# Patient Record
Sex: FEMALE | Race: WHITE | NOT HISPANIC OR LATINO
[De-identification: names, ages, dates, MRNs, and addresses within clinical notes are randomized per-mention and may not be internally consistent; named-entity substitution may affect disease eponyms.]

---

## 2018-04-30 PROBLEM — Z00.00 ENCOUNTER FOR PREVENTIVE HEALTH EXAMINATION: Status: ACTIVE | Noted: 2018-04-30

## 2018-05-02 ENCOUNTER — APPOINTMENT (OUTPATIENT)
Dept: ORTHOPEDIC SURGERY | Facility: CLINIC | Age: 48
End: 2018-05-02
Payer: COMMERCIAL

## 2018-05-02 VITALS — HEIGHT: 66 IN | WEIGHT: 190 LBS | BODY MASS INDEX: 30.53 KG/M2

## 2018-05-02 PROCEDURE — 72040 X-RAY EXAM NECK SPINE 2-3 VW: CPT

## 2018-05-02 PROCEDURE — 73590 X-RAY EXAM OF LOWER LEG: CPT | Mod: LT

## 2018-05-02 PROCEDURE — 99204 OFFICE O/P NEW MOD 45 MIN: CPT | Mod: 57

## 2018-05-02 PROCEDURE — 27759 TREATMENT OF TIBIA FRACTURE: CPT | Mod: 55,LT

## 2018-05-15 ENCOUNTER — APPOINTMENT (OUTPATIENT)
Dept: ORTHOPEDIC SURGERY | Facility: CLINIC | Age: 48
End: 2018-05-15
Payer: COMMERCIAL

## 2018-05-15 PROCEDURE — 99024 POSTOP FOLLOW-UP VISIT: CPT

## 2018-05-19 ENCOUNTER — OUTPATIENT (OUTPATIENT)
Dept: OUTPATIENT SERVICES | Facility: HOSPITAL | Age: 48
LOS: 1 days | End: 2018-05-19
Payer: COMMERCIAL

## 2018-05-19 ENCOUNTER — APPOINTMENT (OUTPATIENT)
Dept: CT IMAGING | Facility: CLINIC | Age: 48
End: 2018-05-19
Payer: COMMERCIAL

## 2018-05-19 DIAGNOSIS — Z00.8 ENCOUNTER FOR OTHER GENERAL EXAMINATION: ICD-10-CM

## 2018-05-19 PROCEDURE — 73700 CT LOWER EXTREMITY W/O DYE: CPT

## 2018-05-19 PROCEDURE — 73700 CT LOWER EXTREMITY W/O DYE: CPT | Mod: 26,LT

## 2018-05-25 ENCOUNTER — OTHER (OUTPATIENT)
Age: 48
End: 2018-05-25

## 2018-05-28 ENCOUNTER — EMERGENCY (EMERGENCY)
Facility: HOSPITAL | Age: 48
LOS: 1 days | Discharge: HOME | End: 2018-05-28
Attending: EMERGENCY MEDICINE
Payer: COMMERCIAL

## 2018-05-28 VITALS
SYSTOLIC BLOOD PRESSURE: 121 MMHG | DIASTOLIC BLOOD PRESSURE: 71 MMHG | OXYGEN SATURATION: 98 % | RESPIRATION RATE: 18 BRPM | HEART RATE: 80 BPM

## 2018-05-28 VITALS
OXYGEN SATURATION: 97 % | RESPIRATION RATE: 18 BRPM | HEART RATE: 99 BPM | DIASTOLIC BLOOD PRESSURE: 83 MMHG | WEIGHT: 179.9 LBS | TEMPERATURE: 99 F | SYSTOLIC BLOOD PRESSURE: 117 MMHG | HEIGHT: 66 IN

## 2018-05-28 LAB
ALBUMIN SERPL ELPH-MCNC: 4.2 G/DL — SIGNIFICANT CHANGE UP (ref 3.3–5.2)
ALP SERPL-CCNC: 109 U/L — SIGNIFICANT CHANGE UP (ref 40–120)
ALT FLD-CCNC: 11 U/L — SIGNIFICANT CHANGE UP
ANION GAP SERPL CALC-SCNC: 14 MMOL/L — SIGNIFICANT CHANGE UP (ref 5–17)
APTT BLD: 27.5 SEC — SIGNIFICANT CHANGE UP (ref 27.5–37.4)
AST SERPL-CCNC: 16 U/L — SIGNIFICANT CHANGE UP
BASOPHILS # BLD AUTO: 0.1 K/UL — SIGNIFICANT CHANGE UP (ref 0–0.2)
BASOPHILS NFR BLD AUTO: 1 % — SIGNIFICANT CHANGE UP (ref 0–2)
BILIRUB SERPL-MCNC: 0.3 MG/DL — LOW (ref 0.4–2)
BUN SERPL-MCNC: 14 MG/DL — SIGNIFICANT CHANGE UP (ref 8–20)
CALCIUM SERPL-MCNC: 9.5 MG/DL — SIGNIFICANT CHANGE UP (ref 8.6–10.2)
CHLORIDE SERPL-SCNC: 103 MMOL/L — SIGNIFICANT CHANGE UP (ref 98–107)
CK SERPL-CCNC: 31 U/L — SIGNIFICANT CHANGE UP (ref 25–170)
CO2 SERPL-SCNC: 24 MMOL/L — SIGNIFICANT CHANGE UP (ref 22–29)
CREAT SERPL-MCNC: 0.63 MG/DL — SIGNIFICANT CHANGE UP (ref 0.5–1.3)
EOSINOPHIL # BLD AUTO: 0.6 K/UL — HIGH (ref 0–0.5)
EOSINOPHIL NFR BLD AUTO: 6.6 % — HIGH (ref 0–6)
GLUCOSE SERPL-MCNC: 92 MG/DL — SIGNIFICANT CHANGE UP (ref 70–115)
HCG SERPL-ACNC: <5 MIU/ML — SIGNIFICANT CHANGE UP
HCT VFR BLD CALC: 40.9 % — SIGNIFICANT CHANGE UP (ref 37–47)
HGB BLD-MCNC: 13.3 G/DL — SIGNIFICANT CHANGE UP (ref 12–16)
INR BLD: 1 RATIO — SIGNIFICANT CHANGE UP (ref 0.88–1.16)
LACTATE BLDV-MCNC: 1.7 MMOL/L — SIGNIFICANT CHANGE UP (ref 0.5–2)
LYMPHOCYTES # BLD AUTO: 1.6 K/UL — SIGNIFICANT CHANGE UP (ref 1–4.8)
LYMPHOCYTES # BLD AUTO: 17.8 % — LOW (ref 20–55)
MCHC RBC-ENTMCNC: 31.1 PG — HIGH (ref 27–31)
MCHC RBC-ENTMCNC: 32.5 G/DL — SIGNIFICANT CHANGE UP (ref 32–36)
MCV RBC AUTO: 95.8 FL — SIGNIFICANT CHANGE UP (ref 81–99)
MONOCYTES # BLD AUTO: 0.6 K/UL — SIGNIFICANT CHANGE UP (ref 0–0.8)
MONOCYTES NFR BLD AUTO: 7.2 % — SIGNIFICANT CHANGE UP (ref 3–10)
NEUTROPHILS # BLD AUTO: 6 K/UL — SIGNIFICANT CHANGE UP (ref 1.8–8)
NEUTROPHILS NFR BLD AUTO: 67.2 % — SIGNIFICANT CHANGE UP (ref 37–73)
PLATELET # BLD AUTO: 320 K/UL — SIGNIFICANT CHANGE UP (ref 150–400)
POTASSIUM SERPL-MCNC: 4.2 MMOL/L — SIGNIFICANT CHANGE UP (ref 3.5–5.3)
POTASSIUM SERPL-SCNC: 4.2 MMOL/L — SIGNIFICANT CHANGE UP (ref 3.5–5.3)
PROT SERPL-MCNC: 7.3 G/DL — SIGNIFICANT CHANGE UP (ref 6.6–8.7)
PROTHROM AB SERPL-ACNC: 11 SEC — SIGNIFICANT CHANGE UP (ref 9.8–12.7)
RBC # BLD: 4.27 M/UL — LOW (ref 4.4–5.2)
RBC # FLD: 12.5 % — SIGNIFICANT CHANGE UP (ref 11–15.6)
SODIUM SERPL-SCNC: 141 MMOL/L — SIGNIFICANT CHANGE UP (ref 135–145)
WBC # BLD: 8.9 K/UL — SIGNIFICANT CHANGE UP (ref 4.8–10.8)
WBC # FLD AUTO: 8.9 K/UL — SIGNIFICANT CHANGE UP (ref 4.8–10.8)

## 2018-05-28 PROCEDURE — 36415 COLL VENOUS BLD VENIPUNCTURE: CPT

## 2018-05-28 PROCEDURE — 85027 COMPLETE CBC AUTOMATED: CPT

## 2018-05-28 PROCEDURE — 73590 X-RAY EXAM OF LOWER LEG: CPT

## 2018-05-28 PROCEDURE — 85610 PROTHROMBIN TIME: CPT

## 2018-05-28 PROCEDURE — 73610 X-RAY EXAM OF ANKLE: CPT

## 2018-05-28 PROCEDURE — 99284 EMERGENCY DEPT VISIT MOD MDM: CPT

## 2018-05-28 PROCEDURE — 85730 THROMBOPLASTIN TIME PARTIAL: CPT

## 2018-05-28 PROCEDURE — 83605 ASSAY OF LACTIC ACID: CPT

## 2018-05-28 PROCEDURE — 73610 X-RAY EXAM OF ANKLE: CPT | Mod: 26,LT

## 2018-05-28 PROCEDURE — 93971 EXTREMITY STUDY: CPT | Mod: 26,LT

## 2018-05-28 PROCEDURE — 80053 COMPREHEN METABOLIC PANEL: CPT

## 2018-05-28 PROCEDURE — 93971 EXTREMITY STUDY: CPT

## 2018-05-28 PROCEDURE — 73590 X-RAY EXAM OF LOWER LEG: CPT | Mod: 26,LT

## 2018-05-28 PROCEDURE — 84702 CHORIONIC GONADOTROPIN TEST: CPT

## 2018-05-28 PROCEDURE — 82550 ASSAY OF CK (CPK): CPT

## 2018-05-28 NOTE — ED ADULT NURSE REASSESSMENT NOTE - NS ED NURSE REASSESS COMMENT FT1
pt with no complaints. awaiting ortho for consult, even and unlabored resps with stable vital signs. family at bedside, no change in status since arrival.

## 2018-05-28 NOTE — ED PROVIDER NOTE - PROGRESS NOTE DETAILS
Labs and imaging reassuring. Pt comfortable, neurovascularly intact per ortho. No emergent causes of her paresthesias identified. No concern for compartment syndrome. No evidence of vascular insufficiency. Hardware in place. No DVT. Pt advised nonweightbearing, crutches, RICE, analgesia, f/u with ortho as outpatient Labs and imaging reassuring. Pt comfortable, neurovascularly intact per ortho. No emergent causes of her paresthesias identified. Suspect probable peripheral neuropathy 2/2 surgery without signs of significant compromise. No concern for compartment syndrome. No evidence of vascular insufficiency. Hardware in place. No DVT. Pt advised nonweightbearing, crutches, RICE, analgesia, f/u with ortho as outpatient

## 2018-05-28 NOTE — ED ADULT NURSE NOTE - OBJECTIVE STATEMENT
pt comes to ED with reports of discoloration and increased pain to her lower leg. states she had broken her tib/fib last month, had surgery up in [facility] and now is f.u with ortho Dr Sims here. pt with no manually palpable pedal pulse, but palpable with doppler. pt with swelling noted as well. pt with no cast or splint in place on this RN assessment, skin temp equal bilaterally. pt with slightly paler left lower extremity compared to right. no other complaints, reports she had a repeat CT done last week with no acute findings.

## 2018-05-28 NOTE — ED ADULT NURSE NOTE - CHIEF COMPLAINT QUOTE
Pt BIBA A&Ox3 c/o LLE pain, reports she had tib/fib fracture with surgery approx 1 month ago in [place]. Pt follows up with Dr. Sims, had recent CT scan done, negative for anything acute. Obvious swelling and discoloration present to LLE. Unable to palpate pedal pulses, + pulses present with doppler. Pt c/o worsening pain, numbness and tingling since last week to LLE.

## 2018-05-28 NOTE — ED PROVIDER NOTE - PHYSICAL EXAMINATION
VITALS: reviewed  GEN: NAD, A & O x 4  HEAD/EYES: NCAT, PERRL, EOMI, anicteric sclerae, no conjunctival pallor  ENT: mucus membranes moist, oropharynx WNL, trachea midline, no JVD  RESP: lungs CTA with equal breath sounds bilaterally, chest wall nontender and atraumatic  CV: heart with reg rhythm S1, S2, no murmur; distal pulses intact and symmetric bilaterally  ABDOMEN: normoactive bowel sounds, soft, nondistended, nontender, no palpable masses  : no CVAT  MSK: palpable 1+ dp, compartments are soft, nontender, surgical site clean, dry intact. Skin is shiny, sluggish <2 sec cap refill LLE, whole LLE slightly more swollen than RLE, slightly decreased sensation Medial inter side of LLE, full strength and full range of motion   SKIN: warm, dry, no rash, no bruising, no cyanosis. color appropriate for ethnicity  NEURO: alert, mentating appropriately, no facial asymmetry. gross sensation, motor, coordination are intact  PSYCH: Affect appropriate VITALS: reviewed  GEN: NAD, A & O x 4  HEAD/EYES: NCAT, PERRL, EOMI, anicteric sclerae, no conjunctival pallor  ENT: mucus membranes moist, oropharynx WNL, trachea midline, no JVD  RESP: lungs CTA with equal breath sounds bilaterally, chest wall nontender and atraumatic  CV: heart with reg rhythm S1, S2, no murmur; distal pulses intact and symmetric bilaterally  ABDOMEN: normoactive bowel sounds, soft, nondistended, nontender, no palpable masses  : no CVAT  MSK: palpable 1+ dp, compartments are soft, nontender, surgical site clean, dry intact. Skin is shiny, sluggish <2 sec cap refill LLE, whole LLE slightly more swollen than RLE, full strength and full range of motion   SKIN: warm, dry, no rash, no bruising, no cyanosis. color appropriate for ethnicity  NEURO: alert, mentating appropriately, no facial asymmetry. 5/5 strength throughout all distributions in LEs, sensation intact to light touch throughout without any objective area of deficit, pt with subjective decreased sensation Medial inter side of LLE  PSYCH: Affect appropriate

## 2018-05-28 NOTE — ED PROVIDER NOTE - MEDICAL DECISION MAKING DETAILS
Pt with worsening pain and swelling in LLE ankle, ankle compartments are soft, positive pulses. Subjective decreased sensation and superficial perineal and possible medial plantar distribution.  No motor deficits. Low suspicion for compartment syndrome, low suspicion for acute arterial thrombosis. Will check x-ray for cause in hardware, check for DVT, consult orthopedic PA.

## 2018-05-28 NOTE — ED PROVIDER NOTE - NS ED ROS FT
CONST: see HPI  EYES: no pain, no visual disturbances  ENT: no sore throat, no epistaxis, no rhinorrhea, no hearing changes  CV: no chest pain, no palpitations, no orthopnea, no extremity pain or swelling  RESP: no shortness of breath, no cough, no sputum, no pleurisy, no wheezing  ABD: no abdominal pain, no nausea, no vomiting, no diarrhea, no black or bloody stool  : no dysuria, no hematuria, no frequency, no urgency  MSK: no back pain, no neck pain, no extremity pain  NEURO: no headache, no sensory disturbances, no focal weakness, no dizziness  HEME: no easy bleeding or bruising  SKIN: no diaphoresis, no rash CONST: see HPI  EYES: no pain, no visual disturbances  ENT: no sore throat, no epistaxis, no rhinorrhea, no hearing changes  CV: no chest pain, no palpitations, no orthopnea, no extremity pain or swelling  RESP: no shortness of breath, no cough, no sputum, no pleurisy, no wheezing  ABD: no abdominal pain, no nausea, no vomiting, no diarrhea, no black or bloody stool  : no dysuria, no hematuria, no frequency, no urgency  MSK: no back pain, no neck pain   NEURO: no headache, no sensory disturbances, no focal weakness, no dizziness  HEME: no easy bleeding or bruising  SKIN: no diaphoresis, no rash CONST: see HPI  EYES: no pain, no visual disturbances  ENT: no sore throat, no epistaxis, no rhinorrhea, no hearing changes  CV: no chest pain, no palpitations, no orthopnea, no extremity pain or swelling  RESP: no shortness of breath, no cough, no sputum, no pleurisy, no wheezing  ABD: no abdominal pain, no nausea, no vomiting, no diarrhea, no black or bloody stool  : no dysuria, no hematuria, no frequency, no urgency  MSK: no back pain, no neck pain   NEURO: see HPI, no abel weakness  HEME: no easy bleeding or bruising  SKIN: no diaphoresis, no rash

## 2018-05-28 NOTE — ED PROVIDER NOTE - OBJECTIVE STATEMENT
48 y/o F hx asthma, hypothyroid presents to the ED c/o constant, LLE ankle pain s/p surgery x3-4 days. Pt had a recent surgery on her LLE, she has had constant pain which has worsened the last few days. Post op the pt was prescribed blood thinners and took them for 2 weeks. Confirms tingling upon palpation, and numbness. Confirms loss os sensation on LLE at foot and around ankle compared to RRE. Notes pain when she moves hr ankle and she notes that her leg remains elevated 90% of the time at home. Also states that this morning she felt like she needed her asthma  inhaler but the symptoms resolved on their own. Denies hx blood clots, chest pain, cough, abd pain, dysuria, fever, chills. No further complaints at this time. 48 y/o F hx asthma, hypothyroid presents to the ED c/o constant, LLE ankle pain s/p surgery x3-4 days. Pt had a recent surgery on her LLE, she has had constant pain which has worsened the last few days. Post op the pt was prescribed blood thinners and took them for 2 weeks. Pt complains of persistent tingling without abel nujmbness on LLE at foot and around ankle compared to RRE. Notes pain when she moves hr ankle and she notes that her leg remains elevated 90% of the time at home. Also states that this morning she felt like she needed her asthma  inhaler but the symptoms resolved on their own. Denies hx blood clots, chest pain, cough, abd pain, dysuria, fever, chills. No further complaints at this time. Declining all pain medication at this time.

## 2018-05-28 NOTE — ED ADULT TRIAGE NOTE - CHIEF COMPLAINT QUOTE
Pt BIBA A&Ox3 c/o LLE pain, reports she had tib/fib fracture with surgery approx 1 month ago in [place]. Pt follows up with Dr. Sims, had recent CT scan done, negative for anything acute. Obvious swelling and discoloration present to LLE. Thready pulses present. Pt BIBA A&Ox3 c/o LLE pain, reports she had tib/fib fracture with surgery approx 1 month ago in [place]. Pt follows up with Dr. Sims, had recent CT scan done, negative for anything acute. Obvious swelling and discoloration present to LLE. Unable to palpate pedal pulses, + pulses present with doppler. Pt BIBA A&Ox3 c/o LLE pain, reports she had tib/fib fracture with surgery approx 1 month ago in [place]. Pt follows up with Dr. Sims, had recent CT scan done, negative for anything acute. Obvious swelling and discoloration present to LLE. Unable to palpate pedal pulses, + pulses present with doppler. Pt c/o worsening pain, numbness and tingling since last week to LLE.

## 2018-05-28 NOTE — ED PROVIDER NOTE - PLAN OF CARE
You were seen and evaluated in the emergency department for tingling and swelling in your legs. Your vascular exam was intact, your neurolgic exam was intact. Your labs were reassuring. You had no evidence of a blood clot or hardware migration on imaging. Your exam and workup were otherwise reassuring. You were seen by Dr. Sims's team in orthopedics who felt that there was no immediately dangerous cause of your condition to require intervention.    You may take up to 400mg of ibuprofen (Motrin, Advil) every 6 hours as needed for pain. Please take ibuprofen with food if possible to prevent stomach upset. You may also take up to 1000mg of acetaminophen (Tylenol) every 6 hours as needed for pain. It is ok to mix these medications with each other. Do not mix them with other pain medications such as naproxen (Alieve) or asprin unless specifically instructed by your doctor. Many prescription pain medications and cough medications contain acetaminophen. You may apply ice to the affected area for no more than 20 minutes as needed for comfort. You may apply ice every 2-4 times a day as needed. Elevate your leg above your waist when you are not sitting on it. Use crutches and do not put weight on your left foot    Please return for worsening sensory changes, numbness, blue discoloration, inability to move your feet or toes, significant worsening pain, fevers, chills or other worsening or concerning new symptoms.    Follow up with with your orthopedist in the next week.  Please call your primary care doctor and discuss the visit. You were seen and evaluated in the emergency department for tingling and swelling in your legs. Your vascular exam was intact, your neurolgic exam was intact. Your labs were reassuring. You had no evidence of a blood clot or hardware migration on imaging. Your exam and workup were otherwise reassuring. You were seen by Dr. Sims's team in orthopedics who felt that there was no immediately dangerous cause of your condition to require intervention.    You may take up to 400mg of ibuprofen (Motrin, Advil) every 6 hours as needed for pain. Please take ibuprofen with food if possible to prevent stomach upset. You may also take up to 1000mg of acetaminophen (Tylenol) every 6 hours as needed for pain. It is ok to mix these medications with each other. Do not mix them with other pain medications such as naproxen (Alieve) or asprin unless specifically instructed by your doctor. Many prescription pain medications and cough medications contain acetaminophen. You may apply ice to the affected area for no more than 20 minutes as needed for comfort. You may apply ice every 2-4 times a day as needed. Elevate your leg above your waist when you are not sitting on it. Use crutches and do not put weight on your left foot    Please return for worsening sensory changes, numbness, blue discoloration, inability to move your feet or toes, significant worsening pain, fevers, chills or other worsening or concerning new symptoms.    If your pain and swelling do not improve over the next week, you may need a repeat ultrasound. Please discuss this with Dr. Sims  Follow up with with your orthopedist in the next week.  Please call your primary care doctor and discuss the visit.

## 2018-05-28 NOTE — ED PROVIDER NOTE - CARE PLAN
Principal Discharge DX:	Paresthesia  Assessment and plan of treatment:	You were seen and evaluated in the emergency department for tingling and swelling in your legs. Your vascular exam was intact, your neurolgic exam was intact. Your labs were reassuring. You had no evidence of a blood clot or hardware migration on imaging. Your exam and workup were otherwise reassuring. You were seen by Dr. Sims's team in orthopedics who felt that there was no immediately dangerous cause of your condition to require intervention.    You may take up to 400mg of ibuprofen (Motrin, Advil) every 6 hours as needed for pain. Please take ibuprofen with food if possible to prevent stomach upset. You may also take up to 1000mg of acetaminophen (Tylenol) every 6 hours as needed for pain. It is ok to mix these medications with each other. Do not mix them with other pain medications such as naproxen (Alieve) or asprin unless specifically instructed by your doctor. Many prescription pain medications and cough medications contain acetaminophen. You may apply ice to the affected area for no more than 20 minutes as needed for comfort. You may apply ice every 2-4 times a day as needed. Elevate your leg above your waist when you are not sitting on it. Use crutches and do not put weight on your left foot    Please return for worsening sensory changes, numbness, blue discoloration, inability to move your feet or toes, significant worsening pain, fevers, chills or other worsening or concerning new symptoms.    Follow up with with your orthopedist in the next week.  Please call your primary care doctor and discuss the visit.  Secondary Diagnosis:	Leg swelling  Secondary Diagnosis:	Post-operative state Principal Discharge DX:	Paresthesia  Assessment and plan of treatment:	You were seen and evaluated in the emergency department for tingling and swelling in your legs. Your vascular exam was intact, your neurolgic exam was intact. Your labs were reassuring. You had no evidence of a blood clot or hardware migration on imaging. Your exam and workup were otherwise reassuring. You were seen by Dr. Sims's team in orthopedics who felt that there was no immediately dangerous cause of your condition to require intervention.    You may take up to 400mg of ibuprofen (Motrin, Advil) every 6 hours as needed for pain. Please take ibuprofen with food if possible to prevent stomach upset. You may also take up to 1000mg of acetaminophen (Tylenol) every 6 hours as needed for pain. It is ok to mix these medications with each other. Do not mix them with other pain medications such as naproxen (Alieve) or asprin unless specifically instructed by your doctor. Many prescription pain medications and cough medications contain acetaminophen. You may apply ice to the affected area for no more than 20 minutes as needed for comfort. You may apply ice every 2-4 times a day as needed. Elevate your leg above your waist when you are not sitting on it. Use crutches and do not put weight on your left foot    Please return for worsening sensory changes, numbness, blue discoloration, inability to move your feet or toes, significant worsening pain, fevers, chills or other worsening or concerning new symptoms.    If your pain and swelling do not improve over the next week, you may need a repeat ultrasound. Please discuss this with Dr. Sims  Follow up with with your orthopedist in the next week.  Please call your primary care doctor and discuss the visit.  Secondary Diagnosis:	Leg swelling  Secondary Diagnosis:	Post-operative state

## 2018-05-28 NOTE — CONSULT NOTE ADULT - SUBJECTIVE AND OBJECTIVE BOX
Pt Name: SMITA ASCENCIO    MRN: [id number]      Patient is a 47y Female presenting to the emergency department with a chief complaint of left foot swelling and paresthesias for 1 week. Pt had surgery for tib-fib fx in [place], was seen by Dr Sims outpatient post-op. Pt has been non-weight bearing on the extremity. Pt was seen in the office 2 weeks ago to have sutures removed, pt was asymptomatic and exam was unremarkable. Over the past 1 week pt gradually developed swelling of the foot and ankle, discoloration, and parasthesias at the toes. Pt reports no trauma, no inciting incident. Pain states pain is minimal, and the numbness/discoloration is the reason for her visit. Denies fevers/chills, nausea, vomiting.     REVIEW OF SYSTEMS    General: Alert, responsive, in NAD    Skin/Breast: No rashes, no pruritis     Ophthalmologic: No visual changes. No redness.   	  ENMT:	No discharge. No swelling.    Respiratory and Thorax: No difficulty breathing. No cough.  	   Cardiovascular:	No chest pain. No palpitations.    Gastrointestinal:	 No abdominal pain. No diarrhea.     Genitourinary: No dysuria. No bleeding.    Musculoskeletal: SEE HPI.    Neurological: No sensory or motor changes.     Psychiatric: No anxiety or depression.    Hematology/Lymphatics: No swelling.    Endocrine: No Hx of diabetes.    ROS is otherwise negative.    PAST MEDICAL & SURGICAL HISTORY:  PAST MEDICAL & SURGICAL HISTORY:      Allergies: No Known Allergies      Medications:     FAMILY HISTORY:  : non-contributory    Social History:     Ambulation: Walking independently [ ] With Cane [ ] With Walker [ ]  Bedbound [ ] Crutches [+]                          13.3   8.9   )-----------( 320      ( 28 May 2018 11:53 )             40.9       05-28    141  |  103  |  14.0  ----------------------------<  92  4.2   |  24.0  |  0.63    Ca    9.5      28 May 2018 11:53    TPro  7.3  /  Alb  4.2  /  TBili  0.3<L>  /  DBili  x   /  AST  16  /  ALT  11  /  AlkPhos  109  05-28      Vital Signs Last 24 Hrs  T(C): 37 (28 May 2018 10:57), Max: 37 (28 May 2018 10:57)  T(F): 98.6 (28 May 2018 10:57), Max: 98.6 (28 May 2018 10:57)  HR: 99 (28 May 2018 10:57) (99 - 99)  BP: 117/83 (28 May 2018 10:57) (117/83 - 117/83)  BP(mean): --  RR: 18 (28 May 2018 10:57) (18 - 18)  SpO2: 97% (28 May 2018 10:57) (97% - 97%)    Daily Height in cm: 167.64 (28 May 2018 10:57)    Daily       PHYSICAL EXAM:      Appearance: Alert, responsive, in no acute distress.    Skin: mild reddish-purple discoloration over the dorsum of foot, mild effusion of the ankle and foot    Musculoskeletal:         Left Upper Extremity:       Right Upper Extremity:       Left Lower Extremity: no motor of EHL/FHL, AT/GC+, decreased sensation over the 1st digit and first web space, tenderness over the metacarpals cap refill <2 sec, 2+ DP pulse, calf non-tender, compartments soft and compressible       Right Lower Extremity:    Imaging Studies:    < from: US Duplex Venous Lower Ext Ltd, Left (05.28.18 @ 12:00) >     EXAM:  US DPLX LWR EXT VEINS LTD LT                          PROCEDURE DATE:  05/28/2018        INTERPRETATION:  CLINICAL INFORMATION: Left lower cavity pain and swelling    COMPARISON: None available.    TECHNIQUE: Duplex sonography of the LEFTLOWER extremity with color and   spectral Doppler, with and without compression.      FINDINGS:    There is normal compressibility of the left common femoral, femoral and   popliteal veins. No calf vein thrombosis is detected.    The contralateral common femoral vein is patent.    Doppler examination shows normal spontaneous and phasic flow.    IMPRESSION:     No evidence of left lower extremity deep venous thrombosis.      STUART LE M.D., ATTENDING RADIOLOGIST  This document has been electronically signed. May 28 2018 12:08PM        < end of copied text >      A/P:  Pt is a  47y Female with    found to have left foot swelling and parasthesias s/p tib-fib fixation 4 weeks ago.    PLAN:   * Non weight bearing LLE  * Pain Control  * Ultrasound LLE - negative  * Xrays pending - will discuss images w Dr. Sims Pt Name: SMITA ASCENCIO    MRN: [id number]      Patient is a 47y Female presenting to the emergency department with a chief complaint of left foot swelling and paresthesias for 1 week. Pt had surgery for tib-fib fx in [place], was seen by Dr Sims outpatient post-op. Pt has been non-weight bearing on the extremity. Pt was seen in the office 2 weeks ago to have sutures removed, pt was asymptomatic and exam was unremarkable. Over the past 1 week pt gradually developed swelling of the foot and ankle, discoloration, and parasthesias at the toes. Pt reports no trauma, no inciting incident. Pain states pain is minimal, and the numbness/discoloration is the reason for her visit. Denies fevers/chills, nausea, vomiting.     REVIEW OF SYSTEMS    General: Alert, responsive, in NAD    Skin/Breast: No rashes, no pruritis     Ophthalmologic: No visual changes. No redness.   	  ENMT:	No discharge. No swelling.    Respiratory and Thorax: No difficulty breathing. No cough.  	   Cardiovascular:	No chest pain. No palpitations.    Gastrointestinal:	 No abdominal pain. No diarrhea.     Genitourinary: No dysuria. No bleeding.    Musculoskeletal: SEE HPI.    Neurological: No sensory or motor changes.     Psychiatric: No anxiety or depression.    Hematology/Lymphatics: No swelling.    Endocrine: No Hx of diabetes.    ROS is otherwise negative.    PAST MEDICAL & SURGICAL HISTORY:  PAST MEDICAL & SURGICAL HISTORY:      Allergies: No Known Allergies      Medications:     FAMILY HISTORY:  : non-contributory    Social History:     Ambulation: Walking independently [ ] With Cane [ ] With Walker [ ]  Bedbound [ ] Crutches [+]                          13.3   8.9   )-----------( 320      ( 28 May 2018 11:53 )             40.9       05-28    141  |  103  |  14.0  ----------------------------<  92  4.2   |  24.0  |  0.63    Ca    9.5      28 May 2018 11:53    TPro  7.3  /  Alb  4.2  /  TBili  0.3<L>  /  DBili  x   /  AST  16  /  ALT  11  /  AlkPhos  109  05-28      Vital Signs Last 24 Hrs  T(C): 37 (28 May 2018 10:57), Max: 37 (28 May 2018 10:57)  T(F): 98.6 (28 May 2018 10:57), Max: 98.6 (28 May 2018 10:57)  HR: 99 (28 May 2018 10:57) (99 - 99)  BP: 117/83 (28 May 2018 10:57) (117/83 - 117/83)  BP(mean): --  RR: 18 (28 May 2018 10:57) (18 - 18)  SpO2: 97% (28 May 2018 10:57) (97% - 97%)    Daily Height in cm: 167.64 (28 May 2018 10:57)    Daily       PHYSICAL EXAM:      Appearance: Alert, responsive, in no acute distress.    Skin: mild reddish-purple discoloration over the dorsum of foot, mild effusion of the ankle and foot    Musculoskeletal:         Left Upper Extremity:       Right Upper Extremity:       Left Lower Extremity: no motor of EHL/FHL, AT/GC+, decreased sensation over the 1st digit and first web space, tenderness over the metacarpals cap refill <2 sec, 2+ DP pulse, calf non-tender, compartments soft and compressible       Right Lower Extremity:    Imaging Studies:    < from: US Duplex Venous Lower Ext Ltd, Left (05.28.18 @ 12:00) >     EXAM:  US DPLX LWR EXT VEINS LTD LT                          PROCEDURE DATE:  05/28/2018        INTERPRETATION:  CLINICAL INFORMATION: Left lower cavity pain and swelling    COMPARISON: None available.    TECHNIQUE: Duplex sonography of the LEFTLOWER extremity with color and   spectral Doppler, with and without compression.      FINDINGS:    There is normal compressibility of the left common femoral, femoral and   popliteal veins. No calf vein thrombosis is detected.    The contralateral common femoral vein is patent.    Doppler examination shows normal spontaneous and phasic flow.    IMPRESSION:     No evidence of left lower extremity deep venous thrombosis.      STUART LE M.D., ATTENDING RADIOLOGIST  This document has been electronically signed. May 28 2018 12:08PM        < end of copied text >    < from: Xray Ankle Complete 3 Views, Left (05.28.18 @ 15:01) >     EXAM:  ANKLE-LEFT                         EXAM:  TIBIA FIBULA-LEFT                          PROCEDURE DATE:  05/28/2018          INTERPRETATION:  X-RAY LEFT LOWER LEG:    History: Left lower leg pain after surgery to fix fracture 3 days ago    Date and time of exam: 5/20/2018 2:56 PM.    Technique: AP and lateral views of the tibia and fibula were obtained.    Findings: There is an acute fracture of the proximal fibula. Status post   intramedullary ramos insertion into the tibia transfixing the distal tibia   fracture now in anatomic alignment. No prior films for comparison. No   significant overlying soft tissue swelling..    Impression:  Status post ORIF distal tibia fracture..      LEFT ANKLE X-RAY:    Three views were obtained.    Findings: An intramedullary ramos transfixes a fracture of the distal tibia   in anatomic alignment. Alignment at the ankle is normal. The distal   fibula is intact. Ankle joint in normal alignment..    Impression:  Status post ORIF distal tibia fracture..      STUART LE M.D., ATTENDING RADIOLOGIST  This document has been electronically signed. May 28 2018  3:49PM      < end of copied text >      A/P:  Pt is a  47y Female with    found to have left foot swelling and parasthesias s/p tib-fib fixation 4 weeks ago.    PLAN:   * Non weight bearing LLE  * Pain Control, Elevate LLE  * Ultrasound LLE - negative  * Follow up outpatient with Dr Sims

## 2018-06-03 ENCOUNTER — TRANSCRIPTION ENCOUNTER (OUTPATIENT)
Age: 48
End: 2018-06-03

## 2018-06-12 ENCOUNTER — APPOINTMENT (OUTPATIENT)
Dept: ORTHOPEDIC SURGERY | Facility: CLINIC | Age: 48
End: 2018-06-12
Payer: COMMERCIAL

## 2018-06-12 VITALS
WEIGHT: 190 LBS | HEART RATE: 96 BPM | TEMPERATURE: 99.5 F | BODY MASS INDEX: 30.53 KG/M2 | DIASTOLIC BLOOD PRESSURE: 81 MMHG | HEIGHT: 66 IN | SYSTOLIC BLOOD PRESSURE: 119 MMHG

## 2018-06-12 DIAGNOSIS — Z78.9 OTHER SPECIFIED HEALTH STATUS: ICD-10-CM

## 2018-06-12 PROCEDURE — 99024 POSTOP FOLLOW-UP VISIT: CPT

## 2018-06-12 PROCEDURE — 73590 X-RAY EXAM OF LOWER LEG: CPT | Mod: LT

## 2018-06-12 PROCEDURE — 29515 APPLICATION SHORT LEG SPLINT: CPT | Mod: 58,LT

## 2018-07-11 ENCOUNTER — APPOINTMENT (OUTPATIENT)
Dept: ORTHOPEDIC SURGERY | Facility: CLINIC | Age: 48
End: 2018-07-11
Payer: COMMERCIAL

## 2018-07-11 PROCEDURE — 73590 X-RAY EXAM OF LOWER LEG: CPT | Mod: LT

## 2018-07-11 PROCEDURE — 99024 POSTOP FOLLOW-UP VISIT: CPT

## 2018-07-17 ENCOUNTER — OUTPATIENT (OUTPATIENT)
Dept: OUTPATIENT SERVICES | Facility: HOSPITAL | Age: 48
LOS: 1 days | End: 2018-07-17
Payer: COMMERCIAL

## 2018-07-17 DIAGNOSIS — I89.0 LYMPHEDEMA, NOT ELSEWHERE CLASSIFIED: ICD-10-CM

## 2018-07-17 DIAGNOSIS — S82.202D UNSPECIFIED FRACTURE OF SHAFT OF LEFT TIBIA, SUBSEQUENT ENCOUNTER FOR CLOSED FRACTURE WITH ROUTINE HEALING: ICD-10-CM

## 2018-07-17 DIAGNOSIS — S82.292D: ICD-10-CM

## 2018-07-17 LAB
24R-OH-CALCIDIOL SERPL-MCNC: 33.1 PG/ML
25(OH)D3 SERPL-MCNC: 27.7 NG/ML
ALBUMIN SERPL ELPH-MCNC: 4 G/DL
ALP BLD-CCNC: 116 U/L
ALT SERPL-CCNC: 20 U/L
ANION GAP SERPL CALC-SCNC: 12 MMOL/L
AST SERPL-CCNC: 21 U/L
BASOPHILS # BLD AUTO: 0.05 K/UL
BASOPHILS NFR BLD AUTO: 0.7 %
BILIRUB SERPL-MCNC: 0.3 MG/DL
BUN SERPL-MCNC: 11 MG/DL
CALCIUM SERPL-MCNC: 9.2 MG/DL
CALCIUM SERPL-MCNC: 9.2 MG/DL
CHLORIDE SERPL-SCNC: 105 MMOL/L
CO2 SERPL-SCNC: 24 MMOL/L
CREAT SERPL-MCNC: 0.67 MG/DL
CRP SERPL-MCNC: 0.88 MG/DL
EOSINOPHIL # BLD AUTO: 0.44 K/UL
EOSINOPHIL NFR BLD AUTO: 5.8 %
ERYTHROCYTE [SEDIMENTATION RATE] IN BLOOD BY WESTERGREN METHOD: 15 MM/HR
FSH SERPL-MCNC: 5 IU/L
GLUCOSE SERPL-MCNC: 95 MG/DL
HCT VFR BLD CALC: 40.8 %
HGB BLD-MCNC: 12.8 G/DL
IMM GRANULOCYTES NFR BLD AUTO: 0.3 %
INR PPP: 0.96 RATIO
LH SERPL-ACNC: 6.3 IU/L
LYMPHOCYTES # BLD AUTO: 1.86 K/UL
LYMPHOCYTES NFR BLD AUTO: 24.5 %
MAN DIFF?: NORMAL
MCHC RBC-ENTMCNC: 30.4 PG
MCHC RBC-ENTMCNC: 31.4 GM/DL
MCV RBC AUTO: 96.9 FL
MONOCYTES # BLD AUTO: 0.51 K/UL
MONOCYTES NFR BLD AUTO: 6.7 %
NEUTROPHILS # BLD AUTO: 4.7 K/UL
NEUTROPHILS NFR BLD AUTO: 62 %
PARATHYROID HORMONE INTACT: 47 PG/ML
PLATELET # BLD AUTO: 379 K/UL
POTASSIUM SERPL-SCNC: 4.3 MMOL/L
PREALB SERPL NEPH-MCNC: 17 MG/DL
PROT SERPL-MCNC: 6.7 G/DL
PT BLD: 10.8 SEC
RBC # BLD: 4.21 M/UL
RBC # FLD: 13.2 %
SODIUM SERPL-SCNC: 141 MMOL/L
T3 SERPL-MCNC: 144 NG/DL
T4 SERPL-MCNC: 8 UG/DL
TSH SERPL-ACNC: 3.02 UIU/ML
WBC # FLD AUTO: 7.58 K/UL

## 2018-07-18 ENCOUNTER — FORM ENCOUNTER (OUTPATIENT)
Age: 48
End: 2018-07-18

## 2018-07-18 LAB — GH SERPL-MCNC: 3.22 NG/ML

## 2018-07-19 ENCOUNTER — APPOINTMENT (OUTPATIENT)
Dept: ULTRASOUND IMAGING | Facility: CLINIC | Age: 48
End: 2018-07-19
Payer: COMMERCIAL

## 2018-07-19 ENCOUNTER — OUTPATIENT (OUTPATIENT)
Dept: OUTPATIENT SERVICES | Facility: HOSPITAL | Age: 48
LOS: 1 days | End: 2018-07-19
Payer: COMMERCIAL

## 2018-07-19 DIAGNOSIS — S82.202A UNSPECIFIED FRACTURE OF SHAFT OF LEFT TIBIA, INITIAL ENCOUNTER FOR CLOSED FRACTURE: ICD-10-CM

## 2018-07-19 PROCEDURE — 93970 EXTREMITY STUDY: CPT

## 2018-07-19 PROCEDURE — 93970 EXTREMITY STUDY: CPT | Mod: 26

## 2018-07-23 LAB
ACTH-ESO: 32 PG/ML
IGF BP1 SERPL-MCNC: 129 NG/ML
TESTOST BND SERPL-MCNC: 0.8 PG/ML
TESTOST SERPL-MCNC: 19.9 NG/DL

## 2018-07-24 ENCOUNTER — APPOINTMENT (OUTPATIENT)
Dept: ORTHOPEDIC SURGERY | Facility: CLINIC | Age: 48
End: 2018-07-24

## 2018-07-30 ENCOUNTER — APPOINTMENT (OUTPATIENT)
Dept: PHYSICAL MEDICINE AND REHAB | Facility: CLINIC | Age: 48
End: 2018-07-30
Payer: COMMERCIAL

## 2018-07-30 PROCEDURE — 99204 OFFICE O/P NEW MOD 45 MIN: CPT

## 2018-08-01 LAB
CORTICOSTEROID BIND GLOBULIN: 2.7 MG/DL
CORTIS SERPL-MCNC: 19 UG/DL
CORTISOL, FREE: 2.6 UG/DL
PFCX: 14 %

## 2018-08-14 ENCOUNTER — APPOINTMENT (OUTPATIENT)
Dept: ORTHOPEDIC SURGERY | Facility: CLINIC | Age: 48
End: 2018-08-14
Payer: COMMERCIAL

## 2018-08-14 VITALS
SYSTOLIC BLOOD PRESSURE: 119 MMHG | DIASTOLIC BLOOD PRESSURE: 75 MMHG | HEART RATE: 97 BPM | WEIGHT: 190 LBS | BODY MASS INDEX: 30.53 KG/M2 | HEIGHT: 66 IN | TEMPERATURE: 99 F

## 2018-08-14 PROCEDURE — 99214 OFFICE O/P EST MOD 30 MIN: CPT

## 2018-08-14 PROCEDURE — 73590 X-RAY EXAM OF LOWER LEG: CPT | Mod: LT

## 2018-09-21 ENCOUNTER — APPOINTMENT (OUTPATIENT)
Dept: PHYSICAL MEDICINE AND REHAB | Facility: CLINIC | Age: 48
End: 2018-09-21

## 2018-09-26 ENCOUNTER — OTHER (OUTPATIENT)
Age: 48
End: 2018-09-26

## 2018-10-12 PROCEDURE — 97163 PT EVAL HIGH COMPLEX 45 MIN: CPT

## 2018-10-12 PROCEDURE — 97010 HOT OR COLD PACKS THERAPY: CPT

## 2018-10-12 PROCEDURE — 97140 MANUAL THERAPY 1/> REGIONS: CPT

## 2018-10-12 PROCEDURE — 97110 THERAPEUTIC EXERCISES: CPT

## 2018-10-12 PROCEDURE — 97162 PT EVAL MOD COMPLEX 30 MIN: CPT

## 2018-12-04 ENCOUNTER — APPOINTMENT (OUTPATIENT)
Dept: ORTHOPEDIC SURGERY | Facility: CLINIC | Age: 48
End: 2018-12-04
Payer: COMMERCIAL

## 2018-12-04 VITALS
HEIGHT: 66 IN | BODY MASS INDEX: 30.53 KG/M2 | HEART RATE: 80 BPM | SYSTOLIC BLOOD PRESSURE: 115 MMHG | WEIGHT: 190 LBS | DIASTOLIC BLOOD PRESSURE: 75 MMHG | TEMPERATURE: 98.1 F

## 2018-12-04 PROCEDURE — 73590 X-RAY EXAM OF LOWER LEG: CPT | Mod: LT

## 2018-12-04 PROCEDURE — 99214 OFFICE O/P EST MOD 30 MIN: CPT

## 2018-12-25 ENCOUNTER — FORM ENCOUNTER (OUTPATIENT)
Age: 48
End: 2018-12-25

## 2018-12-26 ENCOUNTER — APPOINTMENT (OUTPATIENT)
Dept: MRI IMAGING | Facility: CLINIC | Age: 48
End: 2018-12-26
Payer: COMMERCIAL

## 2018-12-26 ENCOUNTER — OUTPATIENT (OUTPATIENT)
Dept: OUTPATIENT SERVICES | Facility: HOSPITAL | Age: 48
LOS: 1 days | End: 2018-12-26
Payer: COMMERCIAL

## 2018-12-26 DIAGNOSIS — S82.202G: ICD-10-CM

## 2018-12-26 DIAGNOSIS — S82.292D: ICD-10-CM

## 2018-12-26 PROCEDURE — 73721 MRI JNT OF LWR EXTRE W/O DYE: CPT | Mod: 26,LT

## 2018-12-26 PROCEDURE — 73721 MRI JNT OF LWR EXTRE W/O DYE: CPT

## 2019-01-16 ENCOUNTER — TRANSCRIPTION ENCOUNTER (OUTPATIENT)
Age: 49
End: 2019-01-16

## 2019-02-11 ENCOUNTER — APPOINTMENT (OUTPATIENT)
Dept: ORTHOPEDIC SURGERY | Facility: CLINIC | Age: 49
End: 2019-02-11
Payer: COMMERCIAL

## 2019-02-11 VITALS
DIASTOLIC BLOOD PRESSURE: 77 MMHG | BODY MASS INDEX: 30.53 KG/M2 | WEIGHT: 190 LBS | HEART RATE: 90 BPM | TEMPERATURE: 98.6 F | HEIGHT: 66 IN | SYSTOLIC BLOOD PRESSURE: 118 MMHG

## 2019-02-11 PROCEDURE — 99214 OFFICE O/P EST MOD 30 MIN: CPT

## 2019-02-11 NOTE — HISTORY OF PRESENT ILLNESS
[de-identified] : The patient was a 47-year-old female who presents today for re evaluation of her left tibial shaft fx due to continued pain. She states that she suffered a fall at a gas station in [facility] and broke her tibia. She initially was seen at [facility] and subsequently transferred to [facility] for definitive fixation. She underwent an intramedullary nail. She comes to see me for postoperative care.The patient states the pain is made worse with activity and relieved with rest. It has improved but a dull ache still persists. SHe is doing similar to at the last visit. She has been able to participate in yoga. She also describes pain over the interlocks. She denies fever or chills, she is hypothyroid. She is not currently taking any narcotics. Modifying factors - worsened by bending, worsened by lifting, worsened by weight bearing, relieved by recumbency and relieved by rest. \par \par  [Bending] : worsened by bending [Lifting] : worsened by lifting [Recumbency] : relieved by recumbency [Rest] : relieved by rest

## 2019-02-11 NOTE — DISCUSSION/SUMMARY
[de-identified] : 47-year-old female status post spiral tibia fracture. She is doing reasonably well in the postoperative period but with continued knee pain & discomfort. She has advanced to full weight bearing. She is healing quite well and doing much better. She may WBAT. We discussed removal of implants but she is not quite ready to proceed. She'll like plan on it in several more months.  She will call to discuss and possible schedule when she is ready to proceed. The patient was given the opportunity to ask questions and all questions were answered to their satisfaction.\par \par Mannie Sims MD\par Orthopaedic Trauma Surgeon\par [facility]\par [facility] [place]\par \par \par \par \par \par

## 2019-02-11 NOTE — PHYSICAL EXAM
[de-identified] : General: She is well-appearing, not in acute distress, not obese, in a normal mood, with normal affect, normal coordination and oriented x 3. \par Lower Extremity: Physical Exam:\par General: Well appearing, no acute distress, A&O\par Neurologic: A&Ox3, No focal deficits\par Head: NCAT without abrasions, lacerations, or ecchymosis to head, face, or scalp\par Eyes: No scleral icterus, no gross abnormalities\par Respiratory: Equal chest wall expansion bilaterally, no accessory muscle use\par Lymphatic: No lymphadenopathy palpated\par Skin: Warm and dry\par Psychiatric: Normal mood and affect\par \par LLE:\par incisions clean, dry, intact. \par Medial & lateral joint line TTP,\par positive swelling, improved from previous\par Sensation intact to light touch s/s/sp/dp/t\par Fires EHL/FHL/gastroc/tibialis anterior\par ankle ROM 10 dorsiflexion - 30 plantarflexsion\par 1+ PT pulse. \par  [de-identified] : plain films of the left tibia were obtained at the last visit. stable IMN fixation of spiral distal tibia fracture. Good evidence of increased callus & healing as compared to last visit. \par \par

## 2019-04-22 ENCOUNTER — TRANSCRIPTION ENCOUNTER (OUTPATIENT)
Age: 49
End: 2019-04-22

## 2019-07-12 ENCOUNTER — OUTPATIENT (OUTPATIENT)
Dept: OUTPATIENT SERVICES | Facility: HOSPITAL | Age: 49
LOS: 1 days | End: 2019-07-12
Payer: COMMERCIAL

## 2019-07-12 VITALS
TEMPERATURE: 98 F | HEART RATE: 75 BPM | RESPIRATION RATE: 16 BRPM | DIASTOLIC BLOOD PRESSURE: 88 MMHG | HEIGHT: 66 IN | SYSTOLIC BLOOD PRESSURE: 133 MMHG | WEIGHT: 182.32 LBS

## 2019-07-12 DIAGNOSIS — E03.9 HYPOTHYROIDISM, UNSPECIFIED: ICD-10-CM

## 2019-07-12 DIAGNOSIS — Z98.890 OTHER SPECIFIED POSTPROCEDURAL STATES: Chronic | ICD-10-CM

## 2019-07-12 DIAGNOSIS — Z13.89 ENCOUNTER FOR SCREENING FOR OTHER DISORDER: ICD-10-CM

## 2019-07-12 DIAGNOSIS — Z01.818 ENCOUNTER FOR OTHER PREPROCEDURAL EXAMINATION: ICD-10-CM

## 2019-07-12 DIAGNOSIS — Z29.9 ENCOUNTER FOR PROPHYLACTIC MEASURES, UNSPECIFIED: ICD-10-CM

## 2019-07-12 DIAGNOSIS — S82.202A UNSPECIFIED FRACTURE OF SHAFT OF LEFT TIBIA, INITIAL ENCOUNTER FOR CLOSED FRACTURE: ICD-10-CM

## 2019-07-12 LAB
ANION GAP SERPL CALC-SCNC: 12 MMOL/L — SIGNIFICANT CHANGE UP (ref 5–17)
BUN SERPL-MCNC: 11 MG/DL — SIGNIFICANT CHANGE UP (ref 8–20)
CALCIUM SERPL-MCNC: 9.5 MG/DL — SIGNIFICANT CHANGE UP (ref 8.6–10.2)
CHLORIDE SERPL-SCNC: 103 MMOL/L — SIGNIFICANT CHANGE UP (ref 98–107)
CO2 SERPL-SCNC: 25 MMOL/L — SIGNIFICANT CHANGE UP (ref 22–29)
CREAT SERPL-MCNC: 0.69 MG/DL — SIGNIFICANT CHANGE UP (ref 0.5–1.3)
GLUCOSE SERPL-MCNC: 101 MG/DL — SIGNIFICANT CHANGE UP (ref 70–115)
HCT VFR BLD CALC: 40.5 % — SIGNIFICANT CHANGE UP (ref 34.5–45)
HGB BLD-MCNC: 13.6 G/DL — SIGNIFICANT CHANGE UP (ref 11.5–15.5)
MCHC RBC-ENTMCNC: 31.8 PG — SIGNIFICANT CHANGE UP (ref 27–34)
MCHC RBC-ENTMCNC: 33.6 GM/DL — SIGNIFICANT CHANGE UP (ref 32–36)
MCV RBC AUTO: 94.6 FL — SIGNIFICANT CHANGE UP (ref 80–100)
PLATELET # BLD AUTO: 310 K/UL — SIGNIFICANT CHANGE UP (ref 150–400)
POTASSIUM SERPL-MCNC: 4 MMOL/L — SIGNIFICANT CHANGE UP (ref 3.5–5.3)
POTASSIUM SERPL-SCNC: 4 MMOL/L — SIGNIFICANT CHANGE UP (ref 3.5–5.3)
RBC # BLD: 4.28 M/UL — SIGNIFICANT CHANGE UP (ref 3.8–5.2)
RBC # FLD: 12.1 % — SIGNIFICANT CHANGE UP (ref 10.3–14.5)
SODIUM SERPL-SCNC: 140 MMOL/L — SIGNIFICANT CHANGE UP (ref 135–145)
WBC # BLD: 6.9 K/UL — SIGNIFICANT CHANGE UP (ref 3.8–10.5)
WBC # FLD AUTO: 6.9 K/UL — SIGNIFICANT CHANGE UP (ref 3.8–10.5)

## 2019-07-12 PROCEDURE — 85027 COMPLETE CBC AUTOMATED: CPT

## 2019-07-12 PROCEDURE — 80048 BASIC METABOLIC PNL TOTAL CA: CPT

## 2019-07-12 PROCEDURE — G0463: CPT

## 2019-07-12 PROCEDURE — 36415 COLL VENOUS BLD VENIPUNCTURE: CPT

## 2019-07-12 NOTE — H&P PST ADULT - ASSESSMENT

## 2019-07-12 NOTE — H&P PST ADULT - NSICDXFAMILYHX_GEN_ALL_CORE_FT
FAMILY HISTORY:  Father  Still living? Yes, Estimated age: 71-80  Family history of CVA, Age at diagnosis: 51-60

## 2019-07-12 NOTE — H&P PST ADULT - NSICDXPROBLEM_GEN_ALL_CORE_FT
PROBLEM DIAGNOSES  Problem: Unspecified fracture of shaft of left tibia, initial encounter for closed fracture  Assessment and Plan: Removal of Hardware from Left Tibia (Interlock screws)    Problem: Need for prophylactic measure  Assessment and Plan:     Problem: Screening for substance abuse  Assessment and Plan: PROBLEM DIAGNOSES  Problem: Unspecified fracture of shaft of left tibia, initial encounter for closed fracture  Assessment and Plan: Removal of Hardware from Left Tibia (Interlock screws)    Problem: Need for prophylactic measure  Assessment and Plan: Moderate risk.  Surgical team to evaluate need for pharmacologic VTE prophylaxis    Problem: Screening for substance abuse  Assessment and Plan: Opioid screening tool score =1.  Low risk for potential abuse

## 2019-07-12 NOTE — H&P PST ADULT - NSICDXPASTMEDICALHX_GEN_ALL_CORE_FT
PAST MEDICAL HISTORY:  Asthma Primarily exercised induced or around animals    Deviated septum     Eczema     Hypothyroidism

## 2019-07-12 NOTE — H&P PST ADULT - HISTORY OF PRESENT ILLNESS
This is a 48 y.o female who presents to PST today. This is a 48 y.o female who presents to PST today.  The pt reports in April 2018 she sustained a fall while at a gas station.  She underwent an ORIF of the left tibia and reports has had increasing discomfort and mobility issues at this time.  She is scheduled for removal of hardware in the near future.

## 2019-07-16 ENCOUNTER — OTHER (OUTPATIENT)
Age: 49
End: 2019-07-16

## 2019-07-25 ENCOUNTER — FORM ENCOUNTER (OUTPATIENT)
Age: 49
End: 2019-07-25

## 2019-07-26 ENCOUNTER — APPOINTMENT (OUTPATIENT)
Dept: ORTHOPEDIC SURGERY | Facility: HOSPITAL | Age: 49
End: 2019-07-26

## 2019-07-26 ENCOUNTER — OUTPATIENT (OUTPATIENT)
Dept: INPATIENT UNIT | Facility: HOSPITAL | Age: 49
LOS: 1 days | End: 2019-07-26
Payer: COMMERCIAL

## 2019-07-26 VITALS
HEIGHT: 66 IN | TEMPERATURE: 98 F | SYSTOLIC BLOOD PRESSURE: 107 MMHG | DIASTOLIC BLOOD PRESSURE: 65 MMHG | HEART RATE: 62 BPM | WEIGHT: 182.32 LBS | OXYGEN SATURATION: 100 % | RESPIRATION RATE: 16 BRPM

## 2019-07-26 VITALS
RESPIRATION RATE: 14 BRPM | HEART RATE: 65 BPM | SYSTOLIC BLOOD PRESSURE: 100 MMHG | DIASTOLIC BLOOD PRESSURE: 60 MMHG | OXYGEN SATURATION: 100 %

## 2019-07-26 DIAGNOSIS — S82.202A UNSPECIFIED FRACTURE OF SHAFT OF LEFT TIBIA, INITIAL ENCOUNTER FOR CLOSED FRACTURE: ICD-10-CM

## 2019-07-26 DIAGNOSIS — Z98.890 OTHER SPECIFIED POSTPROCEDURAL STATES: Chronic | ICD-10-CM

## 2019-07-26 DIAGNOSIS — T84.84XA PAIN DUE TO INTERNAL ORTHOPEDIC PROSTHETIC DEVICES, IMPLANTS AND GRAFTS, INITIAL ENCOUNTER: ICD-10-CM

## 2019-07-26 PROCEDURE — 20680 REMOVAL OF IMPLANT DEEP: CPT | Mod: LT

## 2019-07-26 PROCEDURE — 20680 REMOVAL OF IMPLANT DEEP: CPT

## 2019-07-26 PROCEDURE — 76000 FLUOROSCOPY <1 HR PHYS/QHP: CPT

## 2019-07-26 RX ORDER — FENTANYL CITRATE 50 UG/ML
50 INJECTION INTRAVENOUS
Refills: 0 | Status: DISCONTINUED | OUTPATIENT
Start: 2019-07-26 | End: 2019-07-26

## 2019-07-26 RX ORDER — OXYCODONE HYDROCHLORIDE 5 MG/1
1 TABLET ORAL
Qty: 20 | Refills: 0
Start: 2019-07-26

## 2019-07-26 RX ORDER — SODIUM CHLORIDE 9 MG/ML
1000 INJECTION, SOLUTION INTRAVENOUS
Refills: 0 | Status: DISCONTINUED | OUTPATIENT
Start: 2019-07-26 | End: 2019-07-26

## 2019-07-26 RX ORDER — ONDANSETRON 8 MG/1
8 TABLET, FILM COATED ORAL ONCE
Refills: 0 | Status: DISCONTINUED | OUTPATIENT
Start: 2019-07-26 | End: 2019-07-26

## 2019-07-26 RX ORDER — CEFAZOLIN SODIUM 1 G
2000 VIAL (EA) INJECTION ONCE
Refills: 0 | Status: DISCONTINUED | OUTPATIENT
Start: 2019-07-26 | End: 2019-07-26

## 2019-07-26 RX ORDER — SODIUM CHLORIDE 9 MG/ML
3 INJECTION INTRAMUSCULAR; INTRAVENOUS; SUBCUTANEOUS ONCE
Refills: 0 | Status: DISCONTINUED | OUTPATIENT
Start: 2019-07-26 | End: 2019-07-26

## 2019-07-26 RX ORDER — LEVOTHYROXINE SODIUM 125 MCG
1 TABLET ORAL
Qty: 0 | Refills: 0 | DISCHARGE

## 2019-07-26 RX ORDER — ASPIRIN/CALCIUM CARB/MAGNESIUM 324 MG
1 TABLET ORAL
Qty: 60 | Refills: 0
Start: 2019-07-26 | End: 2019-08-24

## 2019-07-26 NOTE — ASU DISCHARGE PLAN (ADULT/PEDIATRIC) - CARE PROVIDER_API CALL
Mannie Sims)  Orthopaedic Surgery  [address] [place] [address]  Phone: [phone]  Fax: [phone]  Follow Up Time:

## 2019-07-29 PROBLEM — L30.9 DERMATITIS, UNSPECIFIED: Chronic | Status: ACTIVE | Noted: 2019-07-12

## 2019-07-29 PROBLEM — J34.2 DEVIATED NASAL SEPTUM: Chronic | Status: ACTIVE | Noted: 2019-07-12

## 2019-07-29 PROBLEM — J45.909 UNSPECIFIED ASTHMA, UNCOMPLICATED: Chronic | Status: ACTIVE | Noted: 2019-07-12

## 2019-07-29 PROBLEM — E03.9 HYPOTHYROIDISM, UNSPECIFIED: Chronic | Status: ACTIVE | Noted: 2019-07-12

## 2019-08-06 ENCOUNTER — TRANSCRIPTION ENCOUNTER (OUTPATIENT)
Age: 49
End: 2019-08-06

## 2019-08-13 ENCOUNTER — APPOINTMENT (OUTPATIENT)
Dept: ORTHOPEDIC SURGERY | Facility: CLINIC | Age: 49
End: 2019-08-13
Payer: COMMERCIAL

## 2019-08-13 PROCEDURE — 73590 X-RAY EXAM OF LOWER LEG: CPT | Mod: LT

## 2019-08-13 PROCEDURE — 99024 POSTOP FOLLOW-UP VISIT: CPT

## 2019-08-13 NOTE — HISTORY OF PRESENT ILLNESS
[de-identified] : s/p removal of deep tibia implants x4 7/26/19 [de-identified] : 48 year old female presents today for follow up s/p removal of deep tibia implants x4 on 7/26/19. She states that she no longer feels the same pain at the screw sites that she did before removal. She has no numbness or tingling. She has some residual swelling. She is able to ambulate. She has been washing the wounds with soap and water. There have been no wound complications. No questions or concerns. [de-identified] : Physical Exam:\par General: Well appearing, no acute distress, A&O\par Neurologic: A&Ox3, No focal deficits\par Head: NCAT without abrasions, lacerations, or ecchymosis to head, face, or scalp\par Respiratory: Equal chest wall expansion bilaterally, no accessory muscle use\par Lymphatic: No lymphadenopathy palpated\par Skin: Warm and dry\par Psychiatric: Normal mood and affect\par Musculoskeletal: Focused examination of the left leg reveals healing proximal and distal surgical incisions with no erythema, purulence, or drainage. The foot is warm and well perfused. The proximal lower leg is moderately swollen compared to the contralateral side. There is no bony tenderness to palpation. Sensation is intact to light touch. ROM is mildly reduced when compared to the contralateral side.  [de-identified] : Continue weight bearing as tolerated.\par May submerge leg in bath/pool.\par Local wound care as needed.\par \par Agree with above resi\par Resume full activities as tolerated.\par Follow up as needed if there are any questions or concerns.\par \par Orthopaedic Trauma Surgeon Addendum:\par \par I agree with the above resident physician note.  Appropriate imaging has been reviewed and the plan adjusted as needed.\par \par Mannie Sims MD\par Orthopaedic Trauma Surgeon\par [facility]\par [facility] [place] [de-identified] : AP and lateral xrays of the left lower leg reveal excellent bony alignment with interval removal of 4 screws. The tibial nail remains intact and in good position. [de-identified] : 48 year old female s/p removal of deep tibia implants x4 on 7/26/19.

## 2020-01-09 ENCOUNTER — TRANSCRIPTION ENCOUNTER (OUTPATIENT)
Age: 50
End: 2020-01-09

## 2020-07-22 ENCOUNTER — APPOINTMENT (OUTPATIENT)
Dept: MAMMOGRAPHY | Facility: CLINIC | Age: 50
End: 2020-07-22
Payer: COMMERCIAL

## 2020-07-22 ENCOUNTER — OUTPATIENT (OUTPATIENT)
Dept: OUTPATIENT SERVICES | Facility: HOSPITAL | Age: 50
LOS: 1 days | End: 2020-07-22
Payer: COMMERCIAL

## 2020-07-22 DIAGNOSIS — Z00.00 ENCOUNTER FOR GENERAL ADULT MEDICAL EXAMINATION WITHOUT ABNORMAL FINDINGS: ICD-10-CM

## 2020-07-22 DIAGNOSIS — Z12.31 ENCOUNTER FOR SCREENING MAMMOGRAM FOR MALIGNANT NEOPLASM OF BREAST: ICD-10-CM

## 2020-07-22 DIAGNOSIS — Z98.890 OTHER SPECIFIED POSTPROCEDURAL STATES: Chronic | ICD-10-CM

## 2020-07-22 PROCEDURE — 77067 SCR MAMMO BI INCL CAD: CPT | Mod: 26

## 2020-07-22 PROCEDURE — 77067 SCR MAMMO BI INCL CAD: CPT

## 2020-07-22 PROCEDURE — 77063 BREAST TOMOSYNTHESIS BI: CPT | Mod: 26

## 2020-07-22 PROCEDURE — 77063 BREAST TOMOSYNTHESIS BI: CPT

## 2020-11-18 ENCOUNTER — TRANSCRIPTION ENCOUNTER (OUTPATIENT)
Age: 50
End: 2020-11-18

## 2021-12-09 NOTE — ED ADULT NURSE NOTE - NS ED NOTE  TALK SOMEONE YN
[facility] PREOPERATIVE PATIENT INSTRUCTIONS     PARKING:     [facility]: Parking is available I [address] on [address] and [facility]. Pedestrian Walkway bridge is located on the 2nd floor of Parking [address].  is also available at main entrance of Outpatient Pavilion on [address]       GENERAL INSTRUCTIONS:    • One family members/friend who is over the age of 18 may accompany you. A responsible person MUST accompany you home and stay with you the first 24 hours after surgery. Your surgery will be cancelled if these arrangements have not been made.   • If you become ill prior to surgery (I.e. fever, vomiting), please notify your surgeon immediately.   • Children under 12 years old MUST have a parent with them at all times.   • ON DAY OF SURGERY: Do not wear contact lenses, make up, nail polish or jewelry. Leave all valuables at home except what you will need or are instructed to bring.   • For your convenience, [facility] pharmacy is available to fill medications. Please bring a form of payments accepted at [facility] locations.       BRING WITH YOU ON DAY OF SURGERY:  1. Insurance Card and referral (if required), 's license or photo ID  2. All your medications in their original pharmacy bottles  3. Advance directive (living will, Healthcare Power of )  4. All information on your pacemaker or AICD, if appropriate  5. Any other forms, x-rays or films the doctor may have given you  6. Any medical devices (I.e. crutches, braces, sling)  7. Loose fitting clothing, slippers, walking shoes if applicable  8. For comfort measures, you may bring headphones/music device/magazines that can be given to family when you go into the operating room  9. Favorite toy of blanket for children. Formula for feeding after surgery or favorite sippy cup.   10. Please be aware that there are 2 different locations. Hospital patients please arrive at Entrance F on [address] &  [facility] and check in with .         [facility] patients please check in with  Desk right off the 2nd floor Pedestrian walkway entrance.     NPO/Eating/Drinking Restrictions: Please note: If these guidelines are not followed, your procedure will be delayed and possibly cancelled.  For cases starting on or after 3 pm, please contact the anesthesia department regarding NPO status.    Do NOT eat or consume any food or dairy after midnight.  This includes candy or gum.    Acceptable clear liquids can be given 1 hour prior to arrival.  **Acceptable clear liquids: water, high-carbohydrate drink (Ensure Clear Pre-Surgical Drink, Propel; or Clear non-colored Gatorade), apple juice without fiber (non-organic), Pedialyte, 7-up/Sprite, black coffee or tea without milk products or lemon  Unacceptable clear liquids: Coffee or tea with milk products, orange juice, alcohol, soup broth    Do NOT smoke, drink alcohol, or use recreational drugs prior to your surgery.        Please call the location of surgery with questions: Pavilion (up to  6 PM) [phone]; Hospital (up to 7 PM) [phone].   After hours for both locations: [phone]    This information has been reviewed with the patient'S MOTHER on 12/09/21 11:05 AM.      No

## 2022-09-06 ENCOUNTER — NON-APPOINTMENT (OUTPATIENT)
Age: 52
End: 2022-09-06

## 2022-10-06 ENCOUNTER — NON-APPOINTMENT (OUTPATIENT)
Age: 52
End: 2022-10-06

## 2022-10-06 ENCOUNTER — APPOINTMENT (OUTPATIENT)
Dept: OBGYN | Facility: CLINIC | Age: 52
End: 2022-10-06

## 2022-10-06 VITALS
SYSTOLIC BLOOD PRESSURE: 110 MMHG | WEIGHT: 190 LBS | BODY MASS INDEX: 30.53 KG/M2 | DIASTOLIC BLOOD PRESSURE: 70 MMHG | HEIGHT: 66 IN

## 2022-10-06 DIAGNOSIS — S82.402G UNSPECIFIED FRACTURE OF SHAFT OF LEFT TIBIA, SUBSEQUENT ENCOUNTER FOR CLOSED FRACTURE WITH DELAYED HEALING: ICD-10-CM

## 2022-10-06 DIAGNOSIS — S82.202A UNSPECIFIED FRACTURE OF SHAFT OF LEFT TIBIA, INITIAL ENCOUNTER FOR CLOSED FRACTURE: ICD-10-CM

## 2022-10-06 DIAGNOSIS — Z87.09 PERSONAL HISTORY OF OTHER DISEASES OF THE RESPIRATORY SYSTEM: ICD-10-CM

## 2022-10-06 DIAGNOSIS — Z86.39 PERSONAL HISTORY OF OTHER ENDOCRINE, NUTRITIONAL AND METABOLIC DISEASE: ICD-10-CM

## 2022-10-06 DIAGNOSIS — Z12.39 ENCOUNTER FOR OTHER SCREENING FOR MALIGNANT NEOPLASM OF BREAST: ICD-10-CM

## 2022-10-06 DIAGNOSIS — Z87.39 PERSONAL HISTORY OF OTHER DISEASES OF THE MUSCULOSKELETAL SYSTEM AND CONNECTIVE TISSUE: ICD-10-CM

## 2022-10-06 DIAGNOSIS — S82.292D: ICD-10-CM

## 2022-10-06 DIAGNOSIS — S82.202G UNSPECIFIED FRACTURE OF SHAFT OF LEFT TIBIA, SUBSEQUENT ENCOUNTER FOR CLOSED FRACTURE WITH DELAYED HEALING: ICD-10-CM

## 2022-10-06 DIAGNOSIS — S82.892A OTHER FRACTURE OF LEFT LOWER LEG, INITIAL ENCOUNTER FOR CLOSED FRACTURE: ICD-10-CM

## 2022-10-06 DIAGNOSIS — Z88.9 ALLERGY STATUS TO UNSPECIFIED DRUGS, MEDICAMENTS AND BIOLOGICAL SUBSTANCES: ICD-10-CM

## 2022-10-06 DIAGNOSIS — Z87.42 PERSONAL HISTORY OF OTHER DISEASES OF THE FEMALE GENITAL TRACT: ICD-10-CM

## 2022-10-06 DIAGNOSIS — Z87.2 PERSONAL HISTORY OF DISEASES OF THE SKIN AND SUBCUTANEOUS TISSUE: ICD-10-CM

## 2022-10-06 PROCEDURE — 99386 PREV VISIT NEW AGE 40-64: CPT

## 2022-10-06 NOTE — PLAN
[FreeTextEntry1] : 51-year-old female well woman exam\par \par 1.  Pap done\par 2.  Rx screening mammogram\par 3.  Patient was given a referral for colonoscopy\par 4.  History of possible HSV.  The patient was instructed to return to the office when she has an active lesion for a culture.  The patient was counseled on HSV.\par 5.  Annual exam in 1 year

## 2022-10-06 NOTE — HISTORY OF PRESENT ILLNESS
[FreeTextEntry1] : 51-year-old female presents for well woman exam.  She is a new patient to our practice.  Her last GYN exam was 9 years ago.  Menarche was at the age of 12.  Her menses are every 21 to 28 days, lasting 2 to 3 days.  She states she gets 1 day of heavy flow but does not soak through more than 1 pad per hour.  She denies cramping.  She has a history of an abnormal Pap smear and has had cryosurgery to her cervix.  The patient and her  both have a history of cold sores.  The patient states over the past few years she has noticed that she had gotten blisters in her vaginal area.  She states they are painful but go away on their own.  She has never had a work-up for HSV.  She does not have any active lesions today.  She is sexually active with her  who has had a vasectomy.  The patient is due for her screening mammogram.  She has not yet had a screening colonoscopy.\par \par Past medical history is significant for asthma, hypothyroidism, eczema and allergies.  Past surgical history includes a tonsillectomy, uterine polypectomy x2, and leg surgery (ORIF).  Family history is noncontributory.  She socially drinks alcohol.  She denies tobacco or illicit drugs.  GYN history as above.  She is nulligravida.  She has no known drug allergies.  She currently takes Synthroid, Dupixent, albuterol as needed and Xyzal.

## 2022-10-15 LAB
CYTOLOGY CVX/VAG DOC THIN PREP: NORMAL
HPV HIGH+LOW RISK DNA PNL CVX: NOT DETECTED

## 2023-03-08 ENCOUNTER — NON-APPOINTMENT (OUTPATIENT)
Age: 53
End: 2023-03-08

## 2023-03-15 ENCOUNTER — NON-APPOINTMENT (OUTPATIENT)
Age: 53
End: 2023-03-15

## 2023-03-15 ENCOUNTER — APPOINTMENT (OUTPATIENT)
Dept: DERMATOLOGY | Facility: CLINIC | Age: 53
End: 2023-03-15
Payer: COMMERCIAL

## 2023-03-15 PROCEDURE — 99204 OFFICE O/P NEW MOD 45 MIN: CPT

## 2023-04-14 ENCOUNTER — EMERGENCY (EMERGENCY)
Facility: HOSPITAL | Age: 53
LOS: 1 days | Discharge: HOME | End: 2023-04-14
Attending: EMERGENCY MEDICINE
Payer: COMMERCIAL

## 2023-04-14 VITALS
RESPIRATION RATE: 18 BRPM | HEART RATE: 81 BPM | SYSTOLIC BLOOD PRESSURE: 146 MMHG | TEMPERATURE: 99 F | HEIGHT: 66 IN | WEIGHT: 188.94 LBS | OXYGEN SATURATION: 100 % | DIASTOLIC BLOOD PRESSURE: 84 MMHG

## 2023-04-14 DIAGNOSIS — Z98.890 OTHER SPECIFIED POSTPROCEDURAL STATES: Chronic | ICD-10-CM

## 2023-04-14 PROCEDURE — 90675 RABIES VACCINE IM: CPT

## 2023-04-14 PROCEDURE — 99284 EMERGENCY DEPT VISIT MOD MDM: CPT

## 2023-04-14 PROCEDURE — 90377 RABIES IG HT&SOL HUMAN IM/SC: CPT

## 2023-04-14 PROCEDURE — 90471 IMMUNIZATION ADMIN: CPT

## 2023-04-14 PROCEDURE — 99283 EMERGENCY DEPT VISIT LOW MDM: CPT | Mod: 25

## 2023-04-14 PROCEDURE — 90715 TDAP VACCINE 7 YRS/> IM: CPT

## 2023-04-14 RX ORDER — HUMAN RABIES VIRUS IMMUNE GLOBULIN 150 [IU]/ML
1700 INJECTION, SOLUTION INTRAMUSCULAR ONCE
Refills: 0 | Status: COMPLETED | OUTPATIENT
Start: 2023-04-14 | End: 2023-04-14

## 2023-04-14 RX ORDER — TETANUS TOXOID, REDUCED DIPHTHERIA TOXOID AND ACELLULAR PERTUSSIS VACCINE, ADSORBED 5; 2.5; 8; 8; 2.5 [IU]/.5ML; [IU]/.5ML; UG/.5ML; UG/.5ML; UG/.5ML
0.5 SUSPENSION INTRAMUSCULAR ONCE
Refills: 0 | Status: COMPLETED | OUTPATIENT
Start: 2023-04-14 | End: 2023-04-14

## 2023-04-14 RX ORDER — RABIES VACC, HUMAN DIPLOID/PF 2.5 UNIT
1 VIAL (EA) INTRAMUSCULAR ONCE
Refills: 0 | Status: COMPLETED | OUTPATIENT
Start: 2023-04-14 | End: 2023-04-14

## 2023-04-14 RX ADMIN — TETANUS TOXOID, REDUCED DIPHTHERIA TOXOID AND ACELLULAR PERTUSSIS VACCINE, ADSORBED 0.5 MILLILITER(S): 5; 2.5; 8; 8; 2.5 SUSPENSION INTRAMUSCULAR at 10:58

## 2023-04-14 RX ADMIN — HUMAN RABIES VIRUS IMMUNE GLOBULIN 1700 UNIT(S): 150 INJECTION, SOLUTION INTRAMUSCULAR at 11:30

## 2023-04-14 RX ADMIN — Medication 1 TABLET(S): at 10:58

## 2023-04-14 RX ADMIN — Medication 1 MILLILITER(S): at 11:25

## 2023-04-14 NOTE — ED PROVIDER NOTE - ATTENDING APP SHARED VISIT CONTRIBUTION OF CARE
Sustained bite from raccoon at 3:30 AM today.  Thought she was feeding.  Has abrasions to right hand.   immediately cleaned abrasions after incident.  Physical examination: Superficial abrasion to right hypothenar eminence and superficial abrasion  overlying  dorsum of right first MCP.   A/P: Raccoon bite.  [facility] health contacted; Rabies immunization initiated.  Anticipatory guidance provided and supportive care recommended

## 2023-04-14 NOTE — ED ADULT NURSE NOTE - OBJECTIVE STATEMENT
Assumed care of pt at 1020 in . Pt A&Ox4 c/o left hand raccoon bite. Pt states she was feeding cats this AM and raccoon bit her. Pt denies CP and SOB.

## 2023-04-14 NOTE — ED PROVIDER NOTE - OBJECTIVE STATEMENT
51 yo female Pmh of hypothyroids presents in [place] and  c.o right hand bite with raccoon bite in AM about 4Am . states she thought that she is feeding cats but was not cat and was a raccoon bite her right hand . she washed it right away . she is right handed - denies any n.t - unknown last tenants- never got ribes before  -denies any pregnancy

## 2023-04-14 NOTE — ED ADULT TRIAGE NOTE - INTERNATIONAL TRAVEL

## 2023-04-14 NOTE — ED PROVIDER NOTE - PHYSICAL EXAMINATION
Const: AOX3 nontoxic appearing, no apparent respiratory or physical distress. Stable gait   HEENT: NC/AT. Moist mucous membranes.  Eyes: TERRY. EOMI  Neck: Soft and supple. Full ROM without pain.  Resp: Speaking in full sentences. No evidence of respiratory distress.  Skin:    Right hand with bite / puncture [id number] at the dorsum of the first web of the hand noted - no erythema or edema or ecchymosis - no bony Ttp - radial pulse 2 - superficial abrasion on the thenar eminence noted   Neuro: Awake, alert & oriented x 3. Moves all extremities symmetrically.

## 2023-04-14 NOTE — ED PROVIDER NOTE - NSFOLLOWUPINSTRUCTIONS_ED_ALL_ED_FT
take antibiotic as prescribed 3  come back on 4/17/2023- 4/21/2023 and 4/28/23 for 2nd , 3rd and 4th dose of rabies vaccine     What is rabies?  Rabies is a disease caused by a virus. The virus spreads by bites or scratches from animals that have rabies. It gets into the body from the animal's saliva and spreads to the brain.    In the [place], certain wild animals are more likely than other animals to give people rabies. These animals include bats, raccoons, skunks, foxes, and coyotes. Bat bites are especially likely to spread rabies. It is also possible to get rabies from dogs and cats that have not had a rabies vaccine.    In some parts of the world, like certain countries in [place], dogs are the most likely wild animal to spread rabies. That's because in these countries, most dogs do not get a rabies shot. But other animals can also spread rabies in these countries.    Rabies is a very serious disease. People infected with rabies almost always die if they do not get treatment before symptoms start.    What are the symptoms of rabies?  Rabies symptoms in humans include:    ?Feeling sick, like you have the flu – A person with rabies might not feel like eating. They might have a fever, sore throat, headache, nausea, and vomiting.    ?Acting irritable, excited, or more active than normal    ?Painful or tingling areas on the skin    ?Fear of water or choking on water    ?Weakness in the arms and legs – A person with this symptom might also leak urine.    Animals with rabies can act strangely. For example, they might:    ?Attack people and other animals for no reason    ?Come out in daylight when they are normally out at night – For example, bats and raccoons are not usually out during the day.    Should I see a doctor or nurse?  Yes. Tell a doctor or nurse right away if:    ?You are bitten or scratched by a wild animal, especially a bat, raccoon, skunk, wood, or coyote.    ?You were around a bat or woke up in the room with a bat (bat bites can be hard to see).    ?You were bitten or scratched by a dog, cat, or ferret you know or think has rabies. You should also see a doctor or nurse if you do not know if the animal that bit you had rabies shots. Many pets in the [place] get rabies shots, but not all do.    ?You have a cut, scrape, or other open wound that touched an animal that might have rabies.    ?You are in a country where rabies is common (for example, an  or  country) and an animal bites or scratches you.    Can doctors tell if the animal that bit me has rabies?  Sometimes. If a pet bites you and it seems healthy, it can go to a safe place such as an [facility] hospital for doctors to watch it. Doctors can watch it for 10 days to see if it gets sick. If it stays healthy, you do not need rabies shots. If the pet gets sick, you need shots right away.    If a wild animal bites you and it is not available for testing, or a strange pet bites you and gets away, you might need rabies shots. See your doctor or nurse right away.    Can rabies be prevented?  Yes. If an animal bites you, do these things right away:    ?Wash the wound with lots of soap and water, even if it stings. You can also use an antiseptic such as povidone-iodine (brand name: Betadine).    ?Call your doctor or nurse, or go to the emergency department right away. If you were bitten by an animal that could have rabies, you will need to get shots to prevent rabies.    Other ways to prevent rabies:    ?If you have pets, make sure they get rabies shots as often as needed    ?Do not touch wild animals, even if they look dead    Some people who work with animals, or travel to countries where rabies is common, can get shots to help prevent rabies. But they still need to see a doctor or nurse and get more shots if an animal that could have rabies bites them.    Are rabies shots safe during pregnancy?  Yes. Shots to prevent rabies are safe for an unborn baby. If you are pregnant and an animal that could have rabies bites or scratches you, see a doctor or nurse right away.    What if I start having symptoms of rabies?  If a doctor thinks you have rabies, they will do tests. The tests can show if you have the virus or were exposed to it. Also, some other diseases cause symptoms similar to rabies, so a doctor might do tests for these diseases too.    How is rabies treated?  Once rabies symptoms start, no treatment works well. Most people with rabies symptoms die. That is why preventing rabies is so important. If you think you were exposed to rabies, getting treatment right away can stop the virus from spreading in your body and causing symptoms.    If a person has rabies symptoms, doctors can give treatments to make them more comfortable. These treatments are given in the hospital. They include treatments to relieve pain and trouble breathing.

## 2023-04-14 NOTE — ED ADULT NURSE NOTE - DRUG PRE-SCREENING (DAST -1)
Bottle sent. Can you call patient and let them know? Thanks  
Done    
Glycolax Packets are not covered.  Please send new order for bottle instead.    
Statement Selected

## 2023-04-14 NOTE — ED PROVIDER NOTE - NSICDXPASTSURGICALHX_GEN_ALL_CORE_FT
PAST SURGICAL HISTORY:  History of open reduction and internal fixation (ORIF) procedure Left tibia

## 2023-04-14 NOTE — ED ADULT NURSE NOTE - CAS EDN DISCHARGE ASSESSMENT
Addended by: ROSANGELA FRENCH on: 8/3/2020 05:04 PM     Modules accepted: Orders     Alert and oriented to person, place and time

## 2023-04-14 NOTE — ED ADULT TRIAGE NOTE - MEANS OF ARRIVAL
ambulatory Z Plasty Text: The lesion was extirpated to the level of the fat with a #15 scalpel blade.  Given the location of the defect, shape of the defect and the proximity to free margins a Z-plasty was deemed most appropriate for repair.  Using a sterile surgical marker, the appropriate transposition arms of the Z-plasty were drawn incorporating the defect and placing the expected incisions within the relaxed skin tension lines where possible.    The area thus outlined was incised deep to adipose tissue with a #15 scalpel blade.  The skin margins were undermined to an appropriate distance in all directions utilizing iris scissors.  The opposing transposition arms were then transposed into place in opposite direction and anchored with interrupted buried subcutaneous sutures.

## 2023-04-14 NOTE — ED PROVIDER NOTE - CLINICAL SUMMARY MEDICAL DECISION MAKING FREE TEXT BOX
53 yo female Pmh of hypothyroids presents in [place] and  c.o right hand bite with raccoon bite in AM about 4Am . states she thought that she is feeding cats but was not cat and was a raccoon bite her right hand . she washed it right away . she is right handed - denies any n.t - unknown last tenants- never got ribes before  -denies any pregnancy  Augmentin - wond is clear by pt no signs of infection - update tetanus - rabies IVIG - rabies vaccine   called [facility] spoke With cirilo BATES who confirm

## 2023-04-14 NOTE — ED PROVIDER NOTE - PATIENT PORTAL LINK FT
You can access the FollowMyHealth Patient Portal offered by [facility] by registering at the following website: [email or web address]. By joining [facility]’s FollowMyHealth portal, you will also be able to view your health information using other applications (apps) compatible with our system.

## 2023-06-08 ENCOUNTER — APPOINTMENT (OUTPATIENT)
Dept: DERMATOLOGY | Facility: CLINIC | Age: 53
End: 2023-06-08

## 2023-09-20 ENCOUNTER — NON-APPOINTMENT (OUTPATIENT)
Age: 53
End: 2023-09-20

## 2023-11-16 ENCOUNTER — NON-APPOINTMENT (OUTPATIENT)
Age: 53
End: 2023-11-16

## 2023-11-17 ENCOUNTER — APPOINTMENT (OUTPATIENT)
Dept: OBGYN | Facility: CLINIC | Age: 53
End: 2023-11-17
Payer: COMMERCIAL

## 2023-11-17 VITALS
DIASTOLIC BLOOD PRESSURE: 79 MMHG | SYSTOLIC BLOOD PRESSURE: 118 MMHG | BODY MASS INDEX: 29.09 KG/M2 | WEIGHT: 181 LBS | HEIGHT: 66 IN

## 2023-11-17 DIAGNOSIS — Z01.419 ENCOUNTER FOR GYNECOLOGICAL EXAMINATION (GENERAL) (ROUTINE) W/OUT ABNORMAL FINDINGS: ICD-10-CM

## 2023-11-17 PROCEDURE — 99396 PREV VISIT EST AGE 40-64: CPT

## 2023-11-22 LAB
CYTOLOGY CVX/VAG DOC THIN PREP: NORMAL
HPV HIGH+LOW RISK DNA PNL CVX: NOT DETECTED

## 2023-12-27 ENCOUNTER — APPOINTMENT (OUTPATIENT)
Dept: GASTROENTEROLOGY | Facility: CLINIC | Age: 53
End: 2023-12-27
Payer: COMMERCIAL

## 2023-12-27 ENCOUNTER — APPOINTMENT (OUTPATIENT)
Dept: NEUROLOGY | Facility: CLINIC | Age: 53
End: 2023-12-27

## 2023-12-27 ENCOUNTER — NON-APPOINTMENT (OUTPATIENT)
Age: 53
End: 2023-12-27

## 2023-12-27 VITALS
RESPIRATION RATE: 14 BRPM | HEART RATE: 80 BPM | DIASTOLIC BLOOD PRESSURE: 83 MMHG | SYSTOLIC BLOOD PRESSURE: 137 MMHG | WEIGHT: 179 LBS | HEIGHT: 66 IN | BODY MASS INDEX: 28.77 KG/M2 | OXYGEN SATURATION: 98 %

## 2023-12-27 DIAGNOSIS — R13.10 DYSPHAGIA, UNSPECIFIED: ICD-10-CM

## 2023-12-27 PROCEDURE — 99203 OFFICE O/P NEW LOW 30 MIN: CPT

## 2023-12-27 RX ORDER — LEVOTHYROXINE SODIUM 75 UG/1
75 TABLET ORAL
Refills: 0 | Status: ACTIVE | COMMUNITY

## 2023-12-27 RX ORDER — DICLOFENAC SODIUM 10 MG/G
1 GEL TOPICAL DAILY
Qty: 1 | Refills: 1 | Status: DISCONTINUED | COMMUNITY
Start: 2018-12-04 | End: 2023-12-27

## 2023-12-27 NOTE — HISTORY OF PRESENT ILLNESS
[FreeTextEntry1] : 53-year-old lady history of asthma, eczema, hypothyroidism, surgical history significant for left leg fracture, tonsillectomy, uterine polypectomy, here to establish care.  No colonoscopy in system.  EGD/colon recently with Miguel Jimenez Mauricio because anesthesia didn't take her insurance in the office Was told she has an ulcer in the esophagus   path: antrum nl, sigmoid HP RF 22 nl <6 no NSAIDs at that time but steroids in July  dry throat, occ cough, mild sore throat ? post-nasal gtt recently handy since started omeprazole felt GERD sx took almost 7 weeks meds but couldn't take it anymore occ globus  weight loss because not feeling well for the past several months - 10 lb neck pain (PT), arm/feet tingling perimenopausal - July last period, recent spotting  IBS since teenager loose stools occ, occ gas

## 2023-12-27 NOTE — PLAN
[TextEntry] : 1.  Patient to obtain prior EGD report; fax number here provided 2.  EGD.  Indication: To assess ulcer healing.  The patient will need esophageal biopsies for vague dysphagia symptoms and duodenal biopsies to evaluate for celiac disease (not performed at the last procedure, she does complain of lifelong loose stools) The procedure(s) was (were) discussed in detail with the patient, including indications, alternatives and limitations.  The risks and benefits were reviewed.  If applicable, the prep directions were reviewed as well, else the patient was told to fast on the day of the procedure.   3.  Patient has multiple autoimmune type disorders including fairly severe eczema, hypothyroidism, and she has a fairly on wrinkled skin for the age of 53.  She is being referred to rheumatology for a positive rheumatoid factor, I advised that she discuss scleroderma with them as well 4.  Per last colonoscopy report only finding was hyperplastic polyp she is due in 10 years again

## 2023-12-27 NOTE — ASSESSMENT
[FreeTextEntry1] : 53-year-old lady history of asthma, eczema, hypothyroidism, surgical history significant for left leg fracture, tonsillectomy, uterine polypectomy, here to establish care.  Patient had a recent EGD which she believes showed an esophageal ulcer, which she has no procedure report from.  She comes with the path report which shows that biopsies were taken in the antrum, which she was told was next to the location of the ulcer.  We discussed at some length the fact that most upper GI tract ulcers happen in the stomach and are not concerning.  Her prior GI started her on a 2-month course of PPI and advised for repeat endoscopy, which I agree is the correct way to manage this lesion.  She would like to have this endoscopy at our facility because there were some difficulty scheduling and getting insurance approval with the prior doctor.

## 2023-12-29 ENCOUNTER — TRANSCRIPTION ENCOUNTER (OUTPATIENT)
Age: 53
End: 2023-12-29

## 2024-01-04 ENCOUNTER — TRANSCRIPTION ENCOUNTER (OUTPATIENT)
Age: 54
End: 2024-01-04

## 2024-01-17 ENCOUNTER — TRANSCRIPTION ENCOUNTER (OUTPATIENT)
Age: 54
End: 2024-01-17

## 2024-01-21 ENCOUNTER — TRANSCRIPTION ENCOUNTER (OUTPATIENT)
Age: 54
End: 2024-01-21

## 2024-01-22 ENCOUNTER — TRANSCRIPTION ENCOUNTER (OUTPATIENT)
Age: 54
End: 2024-01-22

## 2024-01-29 ENCOUNTER — TRANSCRIPTION ENCOUNTER (OUTPATIENT)
Age: 54
End: 2024-01-29

## 2024-01-30 ENCOUNTER — TRANSCRIPTION ENCOUNTER (OUTPATIENT)
Age: 54
End: 2024-01-30

## 2024-01-31 ENCOUNTER — TRANSCRIPTION ENCOUNTER (OUTPATIENT)
Age: 54
End: 2024-01-31

## 2024-02-05 ENCOUNTER — APPOINTMENT (OUTPATIENT)
Dept: GASTROENTEROLOGY | Facility: GI CENTER | Age: 54
End: 2024-02-05

## 2024-02-21 ENCOUNTER — APPOINTMENT (OUTPATIENT)
Dept: RHEUMATOLOGY | Facility: CLINIC | Age: 54
End: 2024-02-21
Payer: COMMERCIAL

## 2024-02-21 VITALS
HEART RATE: 84 BPM | WEIGHT: 175 LBS | HEIGHT: 66 IN | DIASTOLIC BLOOD PRESSURE: 84 MMHG | SYSTOLIC BLOOD PRESSURE: 126 MMHG | OXYGEN SATURATION: 98 % | BODY MASS INDEX: 28.12 KG/M2

## 2024-02-21 DIAGNOSIS — G62.9 POLYNEUROPATHY, UNSPECIFIED: ICD-10-CM

## 2024-02-21 DIAGNOSIS — Z87.898 PERSONAL HISTORY OF OTHER SPECIFIED CONDITIONS: ICD-10-CM

## 2024-02-21 DIAGNOSIS — R76.8 OTHER SPECIFIED ABNORMAL IMMUNOLOGICAL FINDINGS IN SERUM: ICD-10-CM

## 2024-02-21 PROCEDURE — 99204 OFFICE O/P NEW MOD 45 MIN: CPT

## 2024-02-21 NOTE — ASSESSMENT
[FreeTextEntry1] : 52 y/o female referred to rheumatology for neuropathy of b/l UE and LE for elevated RF. Pt started to have neck pain then tingling of b/l hands and feet since 11/2023. Reports some fatigue, nausea, loss of appetite. Pt was seen by neuro and told this may be small fiber neuropathy. Pt is pending extensive labwork results by neuro. MR C-spine negative for compressive neuropathy. Pt had MR brain and EEG which were normal. Pt is deferring EMG/NCV for now. Reports dry mouth in AM but resolves later. 15 lbs unintentional weight loss x 8 months. Occasional headaches. Pt has eczema. Denies joint pains, swelling, redness warmth. Denies Raynaud's, oral ulcers, new rashes, pulm/GI symptoms, night sweats, other systemic symptoms  Patient has RF IgG 22 on AVISE-CTD with rest of autoimmune workup completely negative. Pt's RF is low positive and patient has absolutely symptoms are exam findings of inflammatory arthritis. RF is false positive and pt does not need any further workup unless pt develops symptoms of inflammatory arthritis in the future. Patient's main symptoms (and cause of autoimmune wokrup in the first place) is neuropathy. Neuropathy without any other symptoms is unlikely for systemic autoimmune diseases and rest of autoimmune workup is negative. Patient is being evaluated appropriately by neurology for causes of neuropathy and patient should continue follow up with them for continuing workup. If this is COVID booster side effect, which neurology stated potentially, there would be no workup results or treatment for it. Pt shoulder consider evaluation for JAZZ given her snoring, dry mouth in AM.  RTC PRN

## 2024-02-21 NOTE — HISTORY OF PRESENT ILLNESS
[FreeTextEntry1] : 52 y/o female referred to rheumatology for neuropathy of b/l UE and LE for elevated RF. Pt started to have neck pain then tingling of b/l hands and feet since 11/2023. Reports some fatigue, nausea, loss of appetite. Pt was seen by neuro and told this may be small fiber neuropathy. Pt is pending extensive labwork results by neuro. MR C-spine negative for compressive neuropathy. Pt had MR brain and EEG which were normal. Pt is deferring EMG/NCV for now. Reports dry mouth in AM but resolves later. 15 lbs unintentional weight loss x 8 months. Occasional headaches. Pt has eczema. Denies joint pains, swelling, redness warmth. Denies Raynaud's, oral ulcers, new rashes, pulm/GI symptoms, night sweats, other systemic symptoms  WORKUP:  Remarkable for (12/2023): RF IgG 22 Normal/neg (12/2023): ANDRA, dsDNA, SSA, SSB, Triplett, RNP, Thyroid Ab, CCP, chromatin Ab, C3, C4, cardiolipin Ab, B2GP Ab, SCl-70, centromere Ab, Ada-1, B12, folate, TSH MR C-spine (8/2023): Mild straighening, C6-7 shallow central disc protrusion. No spinal canal narrowing

## 2024-02-21 NOTE — PHYSICAL EXAM
[TextEntry] : GENERAL: Appears in no acute distress  HEENT: EOMI, PERRLA. No conjunctival erythema. Moist mucous membranes. No nasopharyngeal ulcers  NECK: Supple, no cervical lymphadenopathy, no thyromegaly  CARDIOVASCULAR: RRR. S1, S2 auscultated. No murmurs or rubs.  PULMONARY: Clear to auscultation b/l, no wheezes, rales, or crackles  ABDOMINAL: Soft, nontender, nondistended. Bowel sounds present. No organomegaly.  MSK:  No active synovitis, swelling, erythema, or warmth.  No joint tenderness to palpation.  No deformities.  Normal ROM of neck, back, b/l upper and lower extremities.  SKIN: No lesions or rashes  NEURO: No focal deficits PSYCH: AAOx3. Normal affect and thought process.

## 2024-04-03 ENCOUNTER — APPOINTMENT (OUTPATIENT)
Dept: RHEUMATOLOGY | Facility: CLINIC | Age: 54
End: 2024-04-03

## 2024-04-17 ENCOUNTER — APPOINTMENT (OUTPATIENT)
Dept: RHEUMATOLOGY | Facility: CLINIC | Age: 54
End: 2024-04-17

## 2024-04-19 ENCOUNTER — APPOINTMENT (OUTPATIENT)
Dept: NEUROLOGY | Facility: CLINIC | Age: 54
End: 2024-04-19

## 2024-10-04 ENCOUNTER — APPOINTMENT (OUTPATIENT)
Dept: GASTROENTEROLOGY | Facility: CLINIC | Age: 54
End: 2024-10-04
Payer: COMMERCIAL

## 2024-10-04 VITALS
DIASTOLIC BLOOD PRESSURE: 70 MMHG | BODY MASS INDEX: 28.12 KG/M2 | WEIGHT: 175 LBS | SYSTOLIC BLOOD PRESSURE: 120 MMHG | HEIGHT: 66 IN | RESPIRATION RATE: 16 BRPM | HEART RATE: 70 BPM | OXYGEN SATURATION: 98 %

## 2024-10-04 DIAGNOSIS — R07.0 PAIN IN THROAT: ICD-10-CM

## 2024-10-04 PROCEDURE — 99213 OFFICE O/P EST LOW 20 MIN: CPT

## 2024-10-04 NOTE — HISTORY OF PRESENT ILLNESS
[FreeTextEntry1] : Patient with a history of asthma eczema, seasonal allergy and hypothyroidism.  She is a former patient of Dr. Macias Patient has history of IBS-D.  Times of stress she gets loose stools.  Particularly when she travels.  She has had the symptoms since her 20s.  If she has diarrhea she takes Imodium which controls her symptoms.  She has no family history of colon cancer or colon polyps.  She had a colonoscopy in September 2023 at an outside facility.  She was noted to have diverticulosis hemorrhoids and a 3 mm hyperplastic polyp.  Patient tells me that she requested an endoscopy at the time of the colonoscopy.  She does not have history of GERD.  EGD was performed in September 2023 and she was found to have an esophagitis and an antral erosion.  Patient was started on omeprazole 20 mg but she states she then developed acid reflux while on omeprazole.  She then stopped the omeprazole after 2 months.  When she was seeing Dr. Stein she was complaining of dry throat questionable postnasal drip.  She is on Xyzal for allergies.  Most recently she had a MRSA infection of the skin on her arm.  She had 7 days of amoxicillin in early August.  She required a second round of antibiotics but this time it was clindamycin.  She then had a UTI and required amoxicillin again.  States that she began feeling a fullness of her throat.  No throat pain.  No heartburn or regurgitation.  She felt that this was different than her allergies.  Her symptoms have since resolved.

## 2024-10-04 NOTE — ASSESSMENT
[FreeTextEntry1] : A/P Patient with IBS-D Patient is a vegetarian and tries to follow a high-fiber diet.  I did suggest adding Benefiber daily She may use Imodium as needed in times of stress or with diarrhea  Patient with hyperplastic polyp in 2023.  No family history of colon cancer colon polyps. Colonoscopy due 2033  Follow-up as needed

## 2024-10-08 ENCOUNTER — NON-APPOINTMENT (OUTPATIENT)
Age: 54
End: 2024-10-08

## 2024-10-08 ENCOUNTER — APPOINTMENT (OUTPATIENT)
Dept: OBGYN | Facility: CLINIC | Age: 54
End: 2024-10-08
Payer: COMMERCIAL

## 2024-10-08 VITALS
WEIGHT: 180 LBS | DIASTOLIC BLOOD PRESSURE: 82 MMHG | HEIGHT: 66 IN | SYSTOLIC BLOOD PRESSURE: 126 MMHG | BODY MASS INDEX: 28.93 KG/M2

## 2024-10-08 DIAGNOSIS — N95.1 MENOPAUSAL AND FEMALE CLIMACTERIC STATES: ICD-10-CM

## 2024-10-08 PROCEDURE — 99214 OFFICE O/P EST MOD 30 MIN: CPT

## 2024-10-09 PROBLEM — N95.1 MENOPAUSAL SYMPTOMS: Status: ACTIVE | Noted: 2024-10-09

## 2024-10-09 PROBLEM — N95.1 MENOPAUSAL SYMPTOM: Status: RESOLVED | Noted: 2024-10-09 | Resolved: 2024-10-09

## 2024-10-09 RX ORDER — ESTRADIOL AND LEVONORGESTREL .045; .015 MG/D; MG/D
0.045-0.015 PATCH TRANSDERMAL
Qty: 12 | Refills: 0 | Status: ACTIVE | COMMUNITY
Start: 2024-10-09 | End: 1900-01-01

## 2024-11-20 ENCOUNTER — APPOINTMENT (OUTPATIENT)
Dept: OBGYN | Facility: CLINIC | Age: 54
End: 2024-11-20

## 2024-12-09 ENCOUNTER — NON-APPOINTMENT (OUTPATIENT)
Age: 54
End: 2024-12-09

## 2024-12-20 ENCOUNTER — APPOINTMENT (OUTPATIENT)
Dept: OBGYN | Facility: CLINIC | Age: 54
End: 2024-12-20

## 2024-12-20 VITALS
BODY MASS INDEX: 28.93 KG/M2 | SYSTOLIC BLOOD PRESSURE: 116 MMHG | HEIGHT: 66 IN | DIASTOLIC BLOOD PRESSURE: 81 MMHG | WEIGHT: 180 LBS

## 2024-12-20 PROCEDURE — 99459 PELVIC EXAMINATION: CPT

## 2024-12-20 PROCEDURE — 99396 PREV VISIT EST AGE 40-64: CPT

## 2024-12-23 ENCOUNTER — APPOINTMENT (OUTPATIENT)
Dept: OBGYN | Facility: CLINIC | Age: 54
End: 2024-12-23
Payer: COMMERCIAL

## 2024-12-23 VITALS
WEIGHT: 180 LBS | DIASTOLIC BLOOD PRESSURE: 72 MMHG | HEIGHT: 66 IN | SYSTOLIC BLOOD PRESSURE: 114 MMHG | BODY MASS INDEX: 28.93 KG/M2

## 2024-12-23 DIAGNOSIS — R61 GENERALIZED HYPERHIDROSIS: ICD-10-CM

## 2024-12-23 DIAGNOSIS — N94.10 UNSPECIFIED DYSPAREUNIA: ICD-10-CM

## 2024-12-23 DIAGNOSIS — Z12.39 ENCOUNTER FOR OTHER SCREENING FOR MALIGNANT NEOPLASM OF BREAST: ICD-10-CM

## 2024-12-23 DIAGNOSIS — M25.50 PAIN IN UNSPECIFIED JOINT: ICD-10-CM

## 2024-12-23 DIAGNOSIS — Z01.419 ENCOUNTER FOR GYNECOLOGICAL EXAMINATION (GENERAL) (ROUTINE) W/OUT ABNORMAL FINDINGS: ICD-10-CM

## 2024-12-23 DIAGNOSIS — R53.83 OTHER FATIGUE: ICD-10-CM

## 2024-12-23 DIAGNOSIS — N95.1 MENOPAUSAL AND FEMALE CLIMACTERIC STATES: ICD-10-CM

## 2024-12-23 DIAGNOSIS — R41.89 OTHER SYMPTOMS AND SIGNS INVOLVING COGNITIVE FUNCTIONS AND AWARENESS: ICD-10-CM

## 2024-12-23 DIAGNOSIS — L85.3 XEROSIS CUTIS: ICD-10-CM

## 2024-12-23 DIAGNOSIS — Z82.3 FAMILY HISTORY OF STROKE: ICD-10-CM

## 2024-12-23 DIAGNOSIS — R23.2 FLUSHING: ICD-10-CM

## 2024-12-23 DIAGNOSIS — G47.9 SLEEP DISORDER, UNSPECIFIED: ICD-10-CM

## 2024-12-23 LAB — HPV HIGH+LOW RISK DNA PNL CVX: NOT DETECTED

## 2024-12-23 PROCEDURE — 99459 PELVIC EXAMINATION: CPT

## 2024-12-23 PROCEDURE — 99205 OFFICE O/P NEW HI 60 MIN: CPT

## 2024-12-23 RX ORDER — ESTRADIOL 0.25 MG/.25G
0.25 GEL TOPICAL
Qty: 1 | Refills: 2 | Status: DISCONTINUED | COMMUNITY
Start: 2024-12-21 | End: 2024-12-23

## 2024-12-23 RX ORDER — ESTRADIOL 0.06 MG/D
0.06 PATCH TRANSDERMAL
Qty: 3 | Refills: 1 | Status: ACTIVE | COMMUNITY
Start: 2024-12-23 | End: 1900-01-01

## 2024-12-23 RX ORDER — PROGESTERONE 200 MG/1
200 CAPSULE ORAL
Qty: 90 | Refills: 0 | Status: DISCONTINUED | COMMUNITY
Start: 2024-12-21 | End: 2024-12-23

## 2024-12-23 RX ORDER — PROGESTERONE 100 MG/1
100 CAPSULE ORAL
Qty: 90 | Refills: 1 | Status: ACTIVE | COMMUNITY
Start: 2024-12-23 | End: 1900-01-01

## 2024-12-29 LAB — CYTOLOGY CVX/VAG DOC THIN PREP: NORMAL

## 2025-01-10 ENCOUNTER — APPOINTMENT (OUTPATIENT)
Dept: OBGYN | Facility: CLINIC | Age: 55
End: 2025-01-10

## 2025-02-14 ENCOUNTER — APPOINTMENT (OUTPATIENT)
Dept: OBGYN | Facility: CLINIC | Age: 55
End: 2025-02-14

## 2025-03-27 ENCOUNTER — APPOINTMENT (OUTPATIENT)
Dept: OBGYN | Facility: CLINIC | Age: 55
End: 2025-03-27
Payer: COMMERCIAL

## 2025-03-27 VITALS
BODY MASS INDEX: 29.25 KG/M2 | SYSTOLIC BLOOD PRESSURE: 120 MMHG | WEIGHT: 182 LBS | HEIGHT: 66 IN | DIASTOLIC BLOOD PRESSURE: 74 MMHG

## 2025-03-27 DIAGNOSIS — M25.50 PAIN IN UNSPECIFIED JOINT: ICD-10-CM

## 2025-03-27 DIAGNOSIS — R41.89 OTHER SYMPTOMS AND SIGNS INVOLVING COGNITIVE FUNCTIONS AND AWARENESS: ICD-10-CM

## 2025-03-27 DIAGNOSIS — R53.83 OTHER FATIGUE: ICD-10-CM

## 2025-03-27 DIAGNOSIS — G47.9 SLEEP DISORDER, UNSPECIFIED: ICD-10-CM

## 2025-03-27 DIAGNOSIS — R23.2 FLUSHING: ICD-10-CM

## 2025-03-27 DIAGNOSIS — L85.3 XEROSIS CUTIS: ICD-10-CM

## 2025-03-27 DIAGNOSIS — N95.1 MENOPAUSAL AND FEMALE CLIMACTERIC STATES: ICD-10-CM

## 2025-03-27 DIAGNOSIS — N94.10 UNSPECIFIED DYSPAREUNIA: ICD-10-CM

## 2025-03-27 DIAGNOSIS — R61 GENERALIZED HYPERHIDROSIS: ICD-10-CM

## 2025-03-27 PROCEDURE — 99214 OFFICE O/P EST MOD 30 MIN: CPT

## 2025-03-27 RX ORDER — LEVOCETIRIZINE DIHYDROCHLORIDE 5 MG/1
TABLET, FILM COATED ORAL
Refills: 0 | Status: ACTIVE | COMMUNITY

## 2025-03-27 RX ORDER — DUPILUMAB 200 MG/1.14ML
INJECTION, SOLUTION SUBCUTANEOUS
Refills: 0 | Status: ACTIVE | COMMUNITY

## 2025-03-27 RX ORDER — ESTRADIOL 1 MG/G
1 GEL TOPICAL
Qty: 3 | Refills: 2 | Status: ACTIVE | COMMUNITY
Start: 2025-03-27 | End: 1900-01-01

## 2025-03-27 RX ORDER — PROGESTERONE 100 MG/1
100 CAPSULE ORAL
Qty: 90 | Refills: 2 | Status: ACTIVE | COMMUNITY
Start: 2025-03-27 | End: 1900-01-01

## 2025-08-05 ENCOUNTER — NON-APPOINTMENT (OUTPATIENT)
Age: 55
End: 2025-08-05

## 2025-08-07 ENCOUNTER — APPOINTMENT (OUTPATIENT)
Dept: OBGYN | Facility: CLINIC | Age: 55
End: 2025-08-07

## 2025-08-07 VITALS
BODY MASS INDEX: 29.47 KG/M2 | DIASTOLIC BLOOD PRESSURE: 72 MMHG | SYSTOLIC BLOOD PRESSURE: 118 MMHG | HEIGHT: 66 IN | WEIGHT: 183.38 LBS

## 2025-08-07 DIAGNOSIS — N95.1 MENOPAUSAL AND FEMALE CLIMACTERIC STATES: ICD-10-CM

## 2025-08-07 DIAGNOSIS — N39.3 STRESS INCONTINENCE (FEMALE) (MALE): ICD-10-CM

## 2025-08-07 DIAGNOSIS — R82.90 UNSPECIFIED ABNORMAL FINDINGS IN URINE: ICD-10-CM

## 2025-08-07 DIAGNOSIS — Z79.890 HORMONE REPLACEMENT THERAPY: ICD-10-CM

## 2025-08-07 LAB
APPEARANCE: CLEAR
BILIRUBIN URINE: NEGATIVE
BLOOD URINE: ABNORMAL
COLOR: YELLOW
GLUCOSE QUALITATIVE U: NEGATIVE
KETONES URINE: NEGATIVE
LEUKOCYTE ESTERASE URINE: NEGATIVE
NITRITE URINE: NEGATIVE
PH URINE: 5.5
PROTEIN URINE: NEGATIVE
SPECIFIC GRAVITY URINE: 1.02
UROBILINOGEN URINE: 0.2 (ref 0.2–?)

## 2025-08-07 PROCEDURE — 99214 OFFICE O/P EST MOD 30 MIN: CPT

## 2025-08-07 RX ORDER — PROGESTERONE 100 MG/1
100 CAPSULE ORAL AT BEDTIME
Qty: 120 | Refills: 2 | Status: ACTIVE | COMMUNITY
Start: 2025-08-07 | End: 1900-01-01

## 2025-08-07 RX ORDER — ESTRADIOL 0.1 MG/G
0.1 CREAM VAGINAL
Qty: 1 | Refills: 2 | Status: ACTIVE | COMMUNITY
Start: 2025-08-07 | End: 1900-01-01

## 2025-08-07 RX ORDER — ESTRADIOL 1 MG/G
1 GEL TOPICAL
Qty: 3 | Refills: 1 | Status: ACTIVE | COMMUNITY
Start: 2025-08-07 | End: 1900-01-01